# Patient Record
Sex: FEMALE | Race: WHITE | NOT HISPANIC OR LATINO | Employment: UNEMPLOYED | URBAN - METROPOLITAN AREA
[De-identification: names, ages, dates, MRNs, and addresses within clinical notes are randomized per-mention and may not be internally consistent; named-entity substitution may affect disease eponyms.]

---

## 2017-04-25 ENCOUNTER — ALLSCRIPTS OFFICE VISIT (OUTPATIENT)
Dept: OTHER | Facility: OTHER | Age: 52
End: 2017-04-25

## 2017-04-28 LAB — PAP (HISTORICAL): NORMAL

## 2017-07-20 ENCOUNTER — HOSPITAL ENCOUNTER (OUTPATIENT)
Dept: MAMMOGRAPHY | Facility: HOSPITAL | Age: 52
Discharge: HOME | End: 2017-07-20
Attending: SURGERY
Payer: COMMERCIAL

## 2017-07-20 DIAGNOSIS — Z12.31 ENCOUNTER FOR SCREENING MAMMOGRAM FOR MALIGNANT NEOPLASM OF BREAST: ICD-10-CM

## 2017-07-20 PROCEDURE — G0202 SCR MAMMO BI INCL CAD: HCPCS

## 2017-07-26 RX ORDER — MULTIVITAMIN
1 TABLET ORAL DAILY
COMMUNITY
End: 2019-06-19

## 2017-07-31 ENCOUNTER — HOSPITAL ENCOUNTER (OUTPATIENT)
Dept: MAMMOGRAPHY | Facility: CLINIC | Age: 52
Discharge: HOME | End: 2017-07-31
Payer: COMMERCIAL

## 2017-07-31 ENCOUNTER — HOSPITAL ENCOUNTER (OUTPATIENT)
Dept: ULTRASOUND IMAGING | Facility: CLINIC | Age: 52
Discharge: HOME | End: 2017-07-31
Payer: COMMERCIAL

## 2017-07-31 DIAGNOSIS — R92.8 ABNORMAL MAMMOGRAM: ICD-10-CM

## 2017-07-31 DIAGNOSIS — R92.8 OTHER ABNORMAL AND INCONCLUSIVE FINDINGS ON DIAGNOSTIC IMAGING OF BREAST: ICD-10-CM

## 2017-07-31 PROCEDURE — 76642 ULTRASOUND BREAST LIMITED: CPT

## 2017-07-31 PROCEDURE — G0279 TOMOSYNTHESIS, MAMMO: HCPCS

## 2017-07-31 PROCEDURE — G0206 DX MAMMO INCL CAD UNI: HCPCS

## 2017-08-10 ENCOUNTER — TRANSCRIBE ORDERS (OUTPATIENT)
Dept: ADMINISTRATIVE | Facility: HOSPITAL | Age: 52
End: 2017-08-10

## 2017-08-10 ENCOUNTER — ALLSCRIPTS OFFICE VISIT (OUTPATIENT)
Dept: OTHER | Facility: OTHER | Age: 52
End: 2017-08-10

## 2017-08-10 DIAGNOSIS — Z12.31 ENCOUNTER FOR MAMMOGRAM TO ESTABLISH BASELINE MAMMOGRAM: Primary | ICD-10-CM

## 2018-01-14 VITALS
TEMPERATURE: 98.8 F | WEIGHT: 124 LBS | SYSTOLIC BLOOD PRESSURE: 120 MMHG | HEART RATE: 80 BPM | RESPIRATION RATE: 14 BRPM | BODY MASS INDEX: 22.82 KG/M2 | DIASTOLIC BLOOD PRESSURE: 70 MMHG | OXYGEN SATURATION: 98 % | HEIGHT: 62 IN

## 2018-01-15 VITALS
DIASTOLIC BLOOD PRESSURE: 60 MMHG | BODY MASS INDEX: 22.45 KG/M2 | SYSTOLIC BLOOD PRESSURE: 122 MMHG | HEIGHT: 62 IN | WEIGHT: 122 LBS

## 2018-03-30 ENCOUNTER — TRANSCRIBE ORDERS (OUTPATIENT)
Dept: LAB | Facility: HOSPITAL | Age: 53
End: 2018-03-30

## 2018-03-30 ENCOUNTER — APPOINTMENT (OUTPATIENT)
Dept: LAB | Facility: HOSPITAL | Age: 53
End: 2018-03-30
Payer: COMMERCIAL

## 2018-03-30 DIAGNOSIS — Z01.84 IMMUNITY STATUS TESTING: Primary | ICD-10-CM

## 2018-03-30 DIAGNOSIS — Z01.84 IMMUNITY STATUS TESTING: ICD-10-CM

## 2018-03-30 LAB — RUBV IGG SERPL IA-ACNC: 116 IU/ML

## 2018-03-30 PROCEDURE — 86765 RUBEOLA ANTIBODY: CPT

## 2018-03-30 PROCEDURE — 86762 RUBELLA ANTIBODY: CPT

## 2018-03-30 PROCEDURE — 36415 COLL VENOUS BLD VENIPUNCTURE: CPT

## 2018-03-30 PROCEDURE — 86787 VARICELLA-ZOSTER ANTIBODY: CPT

## 2018-03-30 PROCEDURE — 86480 TB TEST CELL IMMUN MEASURE: CPT

## 2018-03-30 PROCEDURE — 86735 MUMPS ANTIBODY: CPT

## 2018-04-01 LAB
ANNOTATION COMMENT IMP: NORMAL
GAMMA INTERFERON BACKGROUND BLD IA-ACNC: 0.18 IU/ML
M TB IFN-G BLD-IMP: NEGATIVE
M TB IFN-G CD4+ BCKGRND COR BLD-ACNC: <0.01 IU/ML
M TB IFN-G CD4+ T-CELLS BLD-ACNC: 0.05 IU/ML
MITOGEN IGNF BLD-ACNC: >10 IU/ML
QUANTIFERON-TB GOLD IN TUBE: NORMAL
SERVICE CMNT-IMP: NORMAL

## 2018-04-03 LAB
MEV IGG SER QL: NORMAL
MUV IGG SER QL: NORMAL
VZV IGG SER IA-ACNC: NORMAL

## 2018-05-03 DIAGNOSIS — N94.3 PREMENSTRUAL SYNDROME: Primary | ICD-10-CM

## 2018-05-03 RX ORDER — PAROXETINE 10 MG/1
TABLET, FILM COATED ORAL
Qty: 30 TABLET | Refills: 0 | Status: SHIPPED | OUTPATIENT
Start: 2018-05-03 | End: 2018-06-07 | Stop reason: SDUPTHER

## 2018-05-09 ENCOUNTER — TRANSCRIBE ORDERS (OUTPATIENT)
Dept: ADMINISTRATIVE | Age: 53
End: 2018-05-09

## 2018-06-05 DIAGNOSIS — N94.3 PREMENSTRUAL SYNDROME: ICD-10-CM

## 2018-06-06 RX ORDER — PAROXETINE 10 MG/1
TABLET, FILM COATED ORAL
Qty: 30 TABLET | Refills: 0 | OUTPATIENT
Start: 2018-06-06

## 2018-06-07 DIAGNOSIS — N94.3 PREMENSTRUAL SYNDROME: ICD-10-CM

## 2018-06-07 RX ORDER — PAROXETINE 10 MG/1
10 TABLET, FILM COATED ORAL DAILY
Qty: 30 TABLET | Refills: 0 | Status: SHIPPED | OUTPATIENT
Start: 2018-06-07 | End: 2018-06-08 | Stop reason: SDUPTHER

## 2018-06-08 ENCOUNTER — ANNUAL EXAM (OUTPATIENT)
Dept: OBGYN CLINIC | Facility: CLINIC | Age: 53
End: 2018-06-08
Payer: COMMERCIAL

## 2018-06-08 VITALS
DIASTOLIC BLOOD PRESSURE: 72 MMHG | HEIGHT: 62 IN | SYSTOLIC BLOOD PRESSURE: 118 MMHG | WEIGHT: 124 LBS | BODY MASS INDEX: 22.82 KG/M2

## 2018-06-08 DIAGNOSIS — Z01.419 ENCNTR FOR GYN EXAM (GENERAL) (ROUTINE) W/O ABN FINDINGS: Primary | ICD-10-CM

## 2018-06-08 DIAGNOSIS — N94.3 PREMENSTRUAL SYNDROME: ICD-10-CM

## 2018-06-08 PROCEDURE — S0612 ANNUAL GYNECOLOGICAL EXAMINA: HCPCS | Performed by: NURSE PRACTITIONER

## 2018-06-08 RX ORDER — PAROXETINE 10 MG/1
10 TABLET, FILM COATED ORAL DAILY
Qty: 30 TABLET | Refills: 11 | Status: SHIPPED | OUTPATIENT
Start: 2018-06-08 | End: 2019-06-04 | Stop reason: SDUPTHER

## 2018-06-08 RX ORDER — BIOTIN 1 MG
1 TABLET ORAL DAILY
COMMUNITY
End: 2019-06-19

## 2018-06-08 NOTE — PROGRESS NOTES
Assessment/Plan   Diagnoses and all orders for this visit:    Encntr for gyn exam (general) (routine) w/o abn findings  -     GP Liquid-Based Pap + HPV Plus    Premenstrual syndrome  -     PARoxetine (PAXIL) 10 mg tablet; Take 1 tablet (10 mg total) by mouth daily for 360 days As directed    Other orders  -     Cholecalciferol (VITAMIN D3) 1000 units CAPS; Take 1 capsule by mouth daily        Discussion    Reviewed normal exam today  Pap with HPV done today  Normal breast exam today  Monthly SBEs advised  Mammograms yearly  Encourage at least 1200 mg calcium citrate + 2000 IUs vitamin D3 divided through diet and supplement throughout the day  Encourage 30-40 min weight bearing exercise most days of week  Rx for paxil sent to pharmacy  All questions have been answered to her satisfaction  RTO for annual or sooner if needed    Subjective     Selvin Molina is a 47 y o  female who presents for annual well woman exam    Last exam 17 Pap Normal last HPV testing 14 negative  Pap guidelines reviewed with patient  Pt would like Pap today  Pt denies any abnormal vaginal discharge, itching, or odor  Pt in a mutually exclusive relationship () with a male partner and denies the need for STD testing today  Menstrual Cycle:  LMP:  no bleed since  On Paxil 10mg to help with hot flashes, working well would like to continue  Pt states still having hot flashes daily worse in evening and with red wine  Denies any mood swings  + vaginal dryness and dyspareunia  OB History      Para Term  AB Living    2 2 2     2    SAB TAB Ectopic Multiple Live Births   Contraception: Post-menopausal  Practices monthly SBEs, no breast complaints today  Last Mammogram 17 BiRad II benign follows with breast specialist Natasha Greer was last seen on 8/10/17  Colonoscopy 2017 Normal per patient  Denies any bowel or bladder issues  Pt follows with PCP for regular check-ups and blood work           Review of Systems   All other systems reviewed and are negative  The following portions of the patient's history were reviewed and updated as appropriate: allergies, current medications, past family history, past medical history, past social history, past surgical history and problem list     No past medical history on file  No past surgical history on file  Family History   Problem Relation Age of Onset    Pulmonary embolism Mother     Heart attack Mother     Diabetes Father     Lung cancer Father        Social History     Social History    Marital status: /Civil Union     Spouse name: N/A    Number of children: N/A    Years of education: N/A     Occupational History    Not on file       Social History Main Topics    Smoking status: Never Smoker    Smokeless tobacco: Never Used    Alcohol use Yes      Comment: 4-5 glasses a week    Drug use: No    Sexual activity: Yes     Partners: Male     Birth control/ protection: Post-menopausal     Other Topics Concern    Not on file     Social History Narrative    No narrative on file         Current Outpatient Prescriptions:     Cholecalciferol (VITAMIN D3) 1000 units CAPS, Take 1 capsule by mouth daily, Disp: , Rfl:     co-enzyme Q-10 30 MG capsule, Take 30 mg by mouth 3 (three) times a day, Disp: , Rfl:     FOLIC ACID PO, Take by mouth, Disp: , Rfl:     MAGNESIUM PO, Take by mouth, Disp: , Rfl:     Multiple Vitamin (MULTIVITAMIN) tablet, Take 1 tablet by mouth daily, Disp: , Rfl:     PARoxetine (PAXIL) 10 mg tablet, Take 1 tablet (10 mg total) by mouth daily for 360 days As directed, Disp: 30 tablet, Rfl: 11    Allergies   Allergen Reactions    Penicillins Hives and Shortness Of Breath       Objective   Vitals:    06/08/18 0832   BP: 118/72   BP Location: Left arm   Patient Position: Sitting   Cuff Size: Standard   Weight: 56 2 kg (124 lb)   Height: 5' 2" (1 575 m)     Physical Exam   Constitutional: She is oriented to person, place, and time  She appears well-developed and well-nourished  HENT:   Head: Normocephalic  Neck: Normal range of motion  Neck supple  No tracheal deviation present  No thyromegaly present  Cardiovascular: Normal rate, regular rhythm and normal heart sounds  Pulmonary/Chest: Effort normal and breath sounds normal  Right breast exhibits no inverted nipple, no mass, no nipple discharge, no skin change and no tenderness  Left breast exhibits no inverted nipple, no mass, no nipple discharge, no skin change and no tenderness  Breasts are symmetrical    Abdominal: Soft  Bowel sounds are normal  She exhibits no distension and no mass  There is no tenderness  There is no rebound and no guarding  Genitourinary: Vagina normal and uterus normal  Rectal exam shows guaiac negative stool  No breast swelling, tenderness, discharge or bleeding  No labial fusion  There is no rash, tenderness, lesion or injury on the right labia  There is no rash, tenderness, lesion or injury on the left labia  Cervix exhibits no motion tenderness, no discharge and no friability  Right adnexum displays no mass, no tenderness and no fullness  Left adnexum displays no mass, no tenderness and no fullness  Genitourinary Comments: Vaginal atrophy   Musculoskeletal: Normal range of motion  Neurological: She is alert and oriented to person, place, and time  Skin: Skin is warm and dry  Psychiatric: She has a normal mood and affect   Her behavior is normal  Judgment and thought content normal

## 2018-06-12 LAB
HPV HR 12 DNA CVX QL NAA+PROBE: NOT DETECTED
HPV16 DNA SPEC QL NAA+PROBE: NOT DETECTED
HPV18 DNA SPEC QL NAA+PROBE: NOT DETECTED
THIN PREP CVX: NORMAL

## 2018-08-02 ENCOUNTER — HOSPITAL ENCOUNTER (OUTPATIENT)
Dept: MAMMOGRAPHY | Facility: HOSPITAL | Age: 53
Discharge: HOME | End: 2018-08-02
Attending: SURGERY
Payer: COMMERCIAL

## 2018-08-02 DIAGNOSIS — Z12.31 ENCOUNTER FOR SCREENING MAMMOGRAM FOR MALIGNANT NEOPLASM OF BREAST: ICD-10-CM

## 2018-08-02 DIAGNOSIS — Z12.31 ENCOUNTER FOR MAMMOGRAM TO ESTABLISH BASELINE MAMMOGRAM: ICD-10-CM

## 2018-08-02 PROCEDURE — 77063 BREAST TOMOSYNTHESIS BI: CPT

## 2018-08-02 PROCEDURE — 77067 SCR MAMMO BI INCL CAD: CPT

## 2018-08-09 ENCOUNTER — OFFICE VISIT (OUTPATIENT)
Dept: SURGICAL ONCOLOGY | Facility: CLINIC | Age: 53
End: 2018-08-09
Payer: COMMERCIAL

## 2018-08-09 VITALS
HEART RATE: 74 BPM | HEIGHT: 62 IN | BODY MASS INDEX: 23.19 KG/M2 | TEMPERATURE: 98.3 F | SYSTOLIC BLOOD PRESSURE: 120 MMHG | WEIGHT: 126 LBS | RESPIRATION RATE: 16 BRPM | DIASTOLIC BLOOD PRESSURE: 70 MMHG

## 2018-08-09 DIAGNOSIS — N60.19 FIBROCYSTIC BREAST DISEASE, UNSPECIFIED LATERALITY: ICD-10-CM

## 2018-08-09 DIAGNOSIS — R92.2 DENSE BREAST TISSUE: ICD-10-CM

## 2018-08-09 DIAGNOSIS — Z12.31 SCREENING MAMMOGRAM, ENCOUNTER FOR: Primary | ICD-10-CM

## 2018-08-09 DIAGNOSIS — Z80.3 FAMILY HISTORY OF BREAST CANCER IN FEMALE: ICD-10-CM

## 2018-08-09 PROCEDURE — 99213 OFFICE O/P EST LOW 20 MIN: CPT | Performed by: SURGERY

## 2018-08-09 NOTE — PROGRESS NOTES
Surgical Oncology Follow Up       [address]  [facility] [address] [address]    Arlette Winn  1965  [id number]  [address] [facility]  [facility] [address] [address]    Chief Complaint   Patient presents with    Breast Problem     Pt is here for 1 year follow up       Assessment/Plan   Diagnoses and all orders for this visit:    Screening mammogram, encounter for  -     Mammo screening bilateral w 3d & cad; Future    Family history of breast cancer in female    Fibrocystic breast disease, unspecified laterality    Dense breast tissue        Advance Care Planning/Advance Directives:  Did not discuss  with the patient  Oncology History:     No history exists  History of Present Illness: follow up  -Interval History: none    Review of Systems:  Review of Systems   Constitutional: Negative for appetite change and fever  Hot flashes   Eyes: Negative  Respiratory: Negative for shortness of breath  Cardiovascular: Negative  Gastrointestinal: Negative  Endocrine: Negative  Genitourinary: Negative  Musculoskeletal: Negative  Negative for arthralgias and myalgias  Skin:        Cold sores from the sun   Allergic/Immunologic: Negative  Neurological: Negative  Hematological: Negative  Negative for adenopathy  Does not bruise/bleed easily  Psychiatric/Behavioral: Negative  Patient Active Problem List   Diagnosis    Dense breast tissue    Fibrocystic breast disease, unspecified laterality    Hot flashes due to menopause    Other fatigue    Anxiety    Family history of breast cancer in female    Screening mammogram, encounter for     No past medical history on file    Past Surgical History:   Procedure Laterality Date    ABDOMINOPLASTY      APPENDECTOMY      CERVIX SURGERY      cryosurgery    NEUROPLASTY / TRANSPOSITION MEDIAN NERVE AT CARPAL TUNNEL      SINUS SURGERY       Family History   Problem Relation Age of Onset    Pulmonary embolism Mother     Heart attack Mother     Diabetes Father     Lung cancer Father     Prostate cancer Father     Colon cancer Paternal Aunt         age dx unk    [employer] Breast cancer Paternal [de-identified]         age dx unk     Colon cancer Paternal Uncle         age dx unk     Lung cancer Paternal Uncle         age dx unk      Social History     Social History    Marital status: /Civil Union     Spouse name: N/A    Number of children: N/A    Years of education: N/A     Occupational History    Not on file       Social History Main Topics    Smoking status: Never Smoker    Smokeless tobacco: Never Used    Alcohol use Yes      Comment: 4-5 glasses a week    Drug use: No    Sexual activity: Yes     Partners: Male     Birth control/ protection: Post-menopausal     Other Topics Concern    Not on file     Social History Narrative    No narrative on file       Current Outpatient Prescriptions:     Cholecalciferol (VITAMIN D3) 1000 units CAPS, Take 1 capsule by mouth daily, Disp: , Rfl:     co-enzyme Q-10 30 MG capsule, Take 30 mg by mouth 3 (three) times a day, Disp: , Rfl:     FOLIC ACID PO, Take by mouth, Disp: , Rfl:     MAGNESIUM PO, Take by mouth, Disp: , Rfl:     Multiple Vitamin (MULTIVITAMIN) tablet, Take 1 tablet by mouth daily, Disp: , Rfl:     PARoxetine (PAXIL) 10 mg tablet, Take 1 tablet (10 mg total) by mouth daily for 360 days As directed, Disp: 30 tablet, Rfl: 11  Allergies   Allergen Reactions    Penicillins Hives and Shortness Of Breath     Pt states she is not allergic to penicillins        The following portions of the patient's history were reviewed and updated as appropriate: allergies, current medications, past family history, past medical history, past social history, past surgical history and problem list         Vitals:    08/09/18 0818   BP: 120/70   Pulse: 74   Resp: 16   Temp: 98 3 °F (36 8 °C)       Physical Exam   Constitutional: She is oriented to person, place, and time  She appears well-developed and well-nourished  HENT:   Head: Normocephalic and atraumatic  Pulmonary/Chest: Right breast exhibits no inverted nipple, no mass, no nipple discharge, no skin change and no tenderness  Left breast exhibits no inverted nipple, no mass, no nipple discharge, no skin change and no tenderness  Lymphadenopathy:        Right axillary: No pectoral and no lateral adenopathy present  Left axillary: No pectoral and no lateral adenopathy present  Right: No supraclavicular adenopathy present  Left: No supraclavicular adenopathy present  Neurological: She is alert and oriented to person, place, and time  Psychiatric: She has a normal mood and affect  Imaging  08/02/2018 bilateral screening mammogram is benign BI-RADS two density of three  I reviewed the above  imaging data  Discussion/Summary:  81-year-old female with dense breast tissue, fibrocystic changes and family history of breast cancer  There are no concerns on examination today or recent mammogram   I will see her again in one year or sooner should the need arise

## 2019-06-04 ENCOUNTER — TELEPHONE (OUTPATIENT)
Dept: OBGYN CLINIC | Facility: CLINIC | Age: 54
End: 2019-06-04

## 2019-06-04 ENCOUNTER — EVALUATION (OUTPATIENT)
Dept: PHYSICAL THERAPY | Facility: CLINIC | Age: 54
End: 2019-06-04
Payer: COMMERCIAL

## 2019-06-04 DIAGNOSIS — N94.3 PREMENSTRUAL SYNDROME: ICD-10-CM

## 2019-06-04 DIAGNOSIS — G89.29 CHRONIC LEFT-SIDED LOW BACK PAIN WITH LEFT-SIDED SCIATICA: Primary | ICD-10-CM

## 2019-06-04 DIAGNOSIS — M54.42 CHRONIC LEFT-SIDED LOW BACK PAIN WITH LEFT-SIDED SCIATICA: Primary | ICD-10-CM

## 2019-06-04 RX ORDER — PAROXETINE 10 MG/1
TABLET, FILM COATED ORAL
Qty: 90 TABLET | Refills: 0 | Status: SHIPPED | OUTPATIENT
Start: 2019-06-04 | End: 2019-06-19 | Stop reason: SDUPTHER

## 2019-06-05 PROCEDURE — 97161 PT EVAL LOW COMPLEX 20 MIN: CPT | Performed by: PHYSICAL THERAPIST

## 2019-06-11 ENCOUNTER — OFFICE VISIT (OUTPATIENT)
Dept: PHYSICAL THERAPY | Facility: CLINIC | Age: 54
End: 2019-06-11
Payer: COMMERCIAL

## 2019-06-11 DIAGNOSIS — G89.29 CHRONIC LEFT-SIDED LOW BACK PAIN WITH LEFT-SIDED SCIATICA: Primary | ICD-10-CM

## 2019-06-11 DIAGNOSIS — M54.42 CHRONIC LEFT-SIDED LOW BACK PAIN WITH LEFT-SIDED SCIATICA: Primary | ICD-10-CM

## 2019-06-11 PROCEDURE — 97112 NEUROMUSCULAR REEDUCATION: CPT | Performed by: PHYSICAL THERAPIST

## 2019-06-13 ENCOUNTER — APPOINTMENT (OUTPATIENT)
Dept: PHYSICAL THERAPY | Facility: CLINIC | Age: 54
End: 2019-06-13
Payer: COMMERCIAL

## 2019-06-19 ENCOUNTER — ANNUAL EXAM (OUTPATIENT)
Dept: OBGYN CLINIC | Facility: CLINIC | Age: 54
End: 2019-06-19
Payer: COMMERCIAL

## 2019-06-19 VITALS
WEIGHT: 124 LBS | DIASTOLIC BLOOD PRESSURE: 76 MMHG | SYSTOLIC BLOOD PRESSURE: 110 MMHG | HEIGHT: 62 IN | BODY MASS INDEX: 22.82 KG/M2

## 2019-06-19 DIAGNOSIS — N94.3 PREMENSTRUAL SYNDROME: ICD-10-CM

## 2019-06-19 DIAGNOSIS — Z01.419 ROUTINE GYNECOLOGICAL EXAMINATION: Primary | ICD-10-CM

## 2019-06-19 PROCEDURE — 99396 PREV VISIT EST AGE 40-64: CPT | Performed by: PHYSICIAN ASSISTANT

## 2019-06-19 RX ORDER — PAROXETINE 10 MG/1
10 TABLET, FILM COATED ORAL DAILY
Qty: 90 TABLET | Refills: 3 | Status: SHIPPED | OUTPATIENT
Start: 2019-06-19 | End: 2020-08-26

## 2019-06-26 ENCOUNTER — APPOINTMENT (OUTPATIENT)
Dept: PHYSICAL THERAPY | Facility: CLINIC | Age: 54
End: 2019-06-26
Payer: COMMERCIAL

## 2019-06-26 LAB — THIN PREP CVX: NORMAL

## 2019-06-28 ENCOUNTER — APPOINTMENT (OUTPATIENT)
Dept: PHYSICAL THERAPY | Facility: CLINIC | Age: 54
End: 2019-06-28
Payer: COMMERCIAL

## 2019-07-10 ENCOUNTER — OFFICE VISIT (OUTPATIENT)
Dept: PHYSICAL THERAPY | Facility: CLINIC | Age: 54
End: 2019-07-10
Payer: COMMERCIAL

## 2019-07-10 DIAGNOSIS — G89.29 CHRONIC LEFT-SIDED LOW BACK PAIN WITH LEFT-SIDED SCIATICA: Primary | ICD-10-CM

## 2019-07-10 DIAGNOSIS — M54.42 CHRONIC LEFT-SIDED LOW BACK PAIN WITH LEFT-SIDED SCIATICA: Primary | ICD-10-CM

## 2019-07-10 PROCEDURE — 97112 NEUROMUSCULAR REEDUCATION: CPT | Performed by: PHYSICAL THERAPIST

## 2019-07-10 NOTE — PROGRESS NOTES
Daily Note     Today's date: 7/10/2019  Patient name: Lyudmila López  : 1965  MRN: [id number]  Referring provider: April Murray DO  Dx:   Encounter Diagnosis     ICD-10-CM    1  Chronic left-sided low back pain with left-sided sciatica M54 42     G89 29                   Subjective: Pt reports good compliance with HEP  Pt reports a 90% reduction in LBP and LE parasthesias  Objective: See treatment diary below  Updated HEP with reintroduction to flexion activities  Assessment: Pt demonstrated a good understanding of long-term management of her LBP and radicular sx  Plan: Pt is appropriate for d/c  Precautions: none    PMHx: anxiety      Dx: L L5/S1 posteriolateral derangement with an extension and stabilization classification      Daily Treatment Diary      Manual  6/4  6/11  7/10                 S1 MWM prone press-ups    1x10  2x10                 Laser L5/S1    3500J  3500J                                                                                               Exercise Diary    7/10                 Abdominal bracing 5"x5                     bridges 1x15  2x15  3x15                 Prone thoracic ext with cervical retraction 1x10  1x15 3x15                 Birddog                       Prone press-ups 1x10  2x10  3x10                 Standing lumbar extension 1x10  3x10  3x10                 Posture education 5 min  5 min                    mini-squats                       Box lifting from 4" step    laundry basket/ 1x10                    crunches     3x20                                                                                                                                                                                                                                                                       Modalities

## 2019-08-05 ENCOUNTER — HOSPITAL ENCOUNTER (OUTPATIENT)
Dept: MAMMOGRAPHY | Facility: HOSPITAL | Age: 54
Discharge: HOME | End: 2019-08-05
Attending: SURGERY
Payer: COMMERCIAL

## 2019-08-05 VITALS — WEIGHT: 124 LBS | BODY MASS INDEX: 22.82 KG/M2 | HEIGHT: 62 IN

## 2019-08-05 DIAGNOSIS — Z12.31 SCREENING MAMMOGRAM, ENCOUNTER FOR: ICD-10-CM

## 2019-08-05 PROCEDURE — 77067 SCR MAMMO BI INCL CAD: CPT

## 2019-08-05 PROCEDURE — 77063 BREAST TOMOSYNTHESIS BI: CPT

## 2019-08-08 ENCOUNTER — OFFICE VISIT (OUTPATIENT)
Dept: SURGICAL ONCOLOGY | Facility: CLINIC | Age: 54
End: 2019-08-08
Payer: COMMERCIAL

## 2019-08-08 VITALS
DIASTOLIC BLOOD PRESSURE: 68 MMHG | BODY MASS INDEX: 23.19 KG/M2 | TEMPERATURE: 98.5 F | WEIGHT: 126 LBS | RESPIRATION RATE: 16 BRPM | HEART RATE: 68 BPM | SYSTOLIC BLOOD PRESSURE: 110 MMHG | HEIGHT: 62 IN

## 2019-08-08 DIAGNOSIS — Z12.31 SCREENING MAMMOGRAM, ENCOUNTER FOR: ICD-10-CM

## 2019-08-08 DIAGNOSIS — R92.2 DENSE BREAST TISSUE: ICD-10-CM

## 2019-08-08 DIAGNOSIS — N60.19 FIBROCYSTIC BREAST DISEASE, UNSPECIFIED LATERALITY: Primary | ICD-10-CM

## 2019-08-08 DIAGNOSIS — Z80.3 FAMILY HISTORY OF BREAST CANCER IN FEMALE: ICD-10-CM

## 2019-08-08 PROCEDURE — 99213 OFFICE O/P EST LOW 20 MIN: CPT | Performed by: SURGERY

## 2019-08-08 NOTE — PROGRESS NOTES
Surgical Oncology Follow Up       [address] [facility] [place]  [address] [facility] [address]  [place] [address]    Renetta Lemons  1965  [id number]  [address] [facility] [place]  [address] [facility] [place] [address]    Chief Complaint   Patient presents with    Follow-up       Assessment/Plan   Diagnoses and all orders for this visit:    Fibrocystic breast disease, unspecified laterality    Screening mammogram, encounter for  -     Mammo screening bilateral w 3d & cad; Future    Dense breast tissue    Family history of breast cancer in female        Advance Care Planning/Advance Directives:  Did not discuss  with the patient  Oncology History:     No history exists  History of Present Illness: Follow-up  -Interval History:  None    Review of Systems:  Review of Systems   Constitutional: Negative  Negative for appetite change and fever  Eyes: Negative  Respiratory: Negative for shortness of breath  Cardiovascular: Negative  Gastrointestinal: Negative  Endocrine: Negative  Genitourinary: Negative  Musculoskeletal: Negative  Negative for arthralgias and myalgias  Skin: Negative  Allergic/Immunologic: Negative  Neurological: Negative  Hematological: Negative  Negative for adenopathy  Does not bruise/bleed easily  Psychiatric/Behavioral: Negative  Patient Active Problem List   Diagnosis    Dense breast tissue    Fibrocystic breast disease, unspecified laterality    Hot flashes due to menopause    Other fatigue    Anxiety    Family history of breast cancer in female    Screening mammogram, encounter for    Routine gynecological examination     History reviewed  No pertinent past medical history    Past Surgical History:   Procedure Laterality Date    ABDOMINOPLASTY      APPENDECTOMY      CERVIX SURGERY      cryosurgery    NEUROPLASTY / TRANSPOSITION MEDIAN NERVE AT CARPAL TUNNEL      SINUS SURGERY       Family History   Problem Relation Age of Onset    Pulmonary embolism Mother     Heart attack Mother     Diabetes Father     Lung cancer Father     Prostate cancer Father 64    Breast cancer Paternal Aunt 47    Colon cancer Paternal Uncle 62    Lung cancer Paternal Uncle         age dx unk     Colon cancer Paternal Aunt 39     Social History     Socioeconomic History    Marital status: /Civil Union     Spouse name: Not on file    Number of children: Not on file    Years of education: Not on file    Highest education level: Not on file   Occupational History    Not on file   Social Needs    Financial resource strain: Not on file    Food insecurity:     Worry: Not on file     Inability: Not on file    Transportation needs:     Medical: Not on file     Non-medical: Not on file   Tobacco Use    Smoking status: Never Smoker    Smokeless tobacco: Never Used   Substance and Sexual Activity    Alcohol use: Yes     Frequency: 2-3 times a week     Drinks per session: 3 or 4     Binge frequency: Never     Comment: 4-5 glasses a week    Drug use: No    Sexual activity: Yes     Partners: Male     Birth control/protection: Post-menopausal   Lifestyle    Physical activity:     Days per week: Not on file     Minutes per session: Not on file    Stress: Not on file   Relationships    Social connections:     Talks on phone: Not on file     Gets together: Not on file     Attends Tenriism service: Not on file     Active member of club or organization: Not on file     Attends meetings of clubs or organizations: Not on file     Relationship status: Not on file    Intimate partner violence:     Fear of current or ex partner: Not on file     Emotionally abused: Not on file     Physically abused: Not on file     Forced sexual activity: Not on file   Other Topics Concern    Not on file   Social History Narrative    Not on file       Current Outpatient Medications:    PARoxetine (PAXIL) 10 mg tablet, Take 1 tablet (10 mg total) by mouth daily, Disp: 90 tablet, Rfl: 3  Allergies   Allergen Reactions    Penicillins Hives and Shortness Of Breath     Pt states she is not allergic to penicillins        The following portions of the patient's history were reviewed and updated as appropriate: allergies, current medications, past family history, past medical history, past social history, past surgical history and problem list         Vitals:    08/08/19 0838   BP: 110/68   Pulse: 68   Resp: 16   Temp: 98 5 °F (36 9 °C)       Physical Exam   Constitutional: She is oriented to person, place, and time  She appears well-developed and well-nourished  HENT:   Head: Normocephalic and atraumatic  Pulmonary/Chest: Right breast exhibits no inverted nipple, no mass, no nipple discharge, no skin change and no tenderness  Left breast exhibits no inverted nipple, no mass, no nipple discharge, no skin change and no tenderness  Lymphadenopathy:        Right axillary: No pectoral and no lateral adenopathy present  Left axillary: No pectoral and no lateral adenopathy present  Right: No supraclavicular adenopathy present  Left: No supraclavicular adenopathy present  Neurological: She is alert and oriented to person, place, and time  Psychiatric: She has a normal mood and affect  Results:  Labs:      Imaging  08/05/2019 bilateral 3D screening mammogram is benign BI-RADS one with a density of three    I reviewed the above  imaging data  Discussion/Summary:  81-year-old female with dense breast tissue, fibrocystic changes and family history of breast cancer  There are no concerns on examination today  Her recent mammogram was benign  I will therefore see her again in one year for another exam or sooner should the need arise

## 2020-08-06 ENCOUNTER — HOSPITAL ENCOUNTER (OUTPATIENT)
Dept: MAMMOGRAPHY | Facility: HOSPITAL | Age: 55
Discharge: HOME | End: 2020-08-06
Attending: SURGERY
Payer: COMMERCIAL

## 2020-08-06 VITALS — BODY MASS INDEX: 23.19 KG/M2 | WEIGHT: 126 LBS | HEIGHT: 62 IN

## 2020-08-06 DIAGNOSIS — Z12.31 SCREENING MAMMOGRAM, ENCOUNTER FOR: ICD-10-CM

## 2020-08-06 PROCEDURE — 77067 SCR MAMMO BI INCL CAD: CPT

## 2020-08-06 PROCEDURE — 77063 BREAST TOMOSYNTHESIS BI: CPT

## 2020-08-12 ENCOUNTER — HOSPITAL ENCOUNTER (OUTPATIENT)
Dept: ULTRASOUND IMAGING | Facility: CLINIC | Age: 55
Discharge: HOME | End: 2020-08-12
Payer: COMMERCIAL

## 2020-08-12 ENCOUNTER — HOSPITAL ENCOUNTER (OUTPATIENT)
Dept: MAMMOGRAPHY | Facility: CLINIC | Age: 55
Discharge: HOME | End: 2020-08-12
Payer: COMMERCIAL

## 2020-08-12 VITALS — BODY MASS INDEX: 23.19 KG/M2 | TEMPERATURE: 98 F | WEIGHT: 126 LBS | HEIGHT: 62 IN

## 2020-08-12 DIAGNOSIS — R92.8 ABNORMAL MAMMOGRAM: ICD-10-CM

## 2020-08-12 PROCEDURE — G0279 TOMOSYNTHESIS, MAMMO: HCPCS

## 2020-08-12 PROCEDURE — 77065 DX MAMMO INCL CAD UNI: CPT

## 2020-08-12 PROCEDURE — 76642 ULTRASOUND BREAST LIMITED: CPT

## 2020-08-13 ENCOUNTER — OFFICE VISIT (OUTPATIENT)
Dept: SURGICAL ONCOLOGY | Facility: CLINIC | Age: 55
End: 2020-08-13
Payer: COMMERCIAL

## 2020-08-13 VITALS
RESPIRATION RATE: 16 BRPM | TEMPERATURE: 98.3 F | HEIGHT: 62 IN | BODY MASS INDEX: 23.74 KG/M2 | SYSTOLIC BLOOD PRESSURE: 120 MMHG | WEIGHT: 129 LBS | HEART RATE: 67 BPM | DIASTOLIC BLOOD PRESSURE: 82 MMHG

## 2020-08-13 DIAGNOSIS — Z80.3 FAMILY HISTORY OF BREAST CANCER IN FEMALE: ICD-10-CM

## 2020-08-13 DIAGNOSIS — Z12.31 SCREENING MAMMOGRAM, ENCOUNTER FOR: ICD-10-CM

## 2020-08-13 DIAGNOSIS — Z87.898 HX OF ABNORMAL MAMMOGRAM: ICD-10-CM

## 2020-08-13 DIAGNOSIS — N60.19 FIBROCYSTIC BREAST DISEASE, UNSPECIFIED LATERALITY: Primary | ICD-10-CM

## 2020-08-13 DIAGNOSIS — R92.2 DENSE BREAST TISSUE: ICD-10-CM

## 2020-08-13 PROCEDURE — 99213 OFFICE O/P EST LOW 20 MIN: CPT | Performed by: SURGERY

## 2020-08-13 NOTE — PROGRESS NOTES
Surgical Oncology Follow Up       [address]  [facility] [place]  [address] [facility] [address]    Joshua Souzakenneth  1965  [id number]  [address] [facility]  [facility] [place]  [address] [facility] [address]    Chief Complaint   Patient presents with    Follow-up     Pt is here for 1 year f/u       Assessment/Plan   Diagnoses and all orders for this visit:    Fibrocystic breast disease, unspecified laterality    Screening mammogram, encounter for  -     Mammo screening bilateral w 3d & cad; Future    Dense breast tissue    Family history of breast cancer in female    Hx of abnormal mammogram        Advance Care Planning/Advance Directives:  Did not discuss  with the patient  Oncology History:    Oncology History    No history exists  History of Present Illness: Follow-up visit secondary to dense breast tissue, family history and fibrocystic changes, no concerns  -Interval History:  Recent mammogram with call back    Review of Systems:  Review of Systems   Constitutional: Negative  Negative for appetite change and fever  Eyes: Negative  Respiratory: Negative for shortness of breath  Cardiovascular: Negative  Gastrointestinal: Negative  Endocrine: Negative  Genitourinary: Negative  Musculoskeletal: Negative  Negative for arthralgias and myalgias  Skin: Negative  Allergic/Immunologic: Negative  Neurological: Negative  Hematological: Negative  Negative for adenopathy  Does not bruise/bleed easily  Psychiatric/Behavioral: Negative  Patient Active Problem List   Diagnosis    Dense breast tissue    Fibrocystic breast disease, unspecified laterality    Hot flashes due to menopause    Other fatigue    Anxiety    Family history of breast cancer in female    Screening mammogram, encounter for    Routine gynecological examination     History reviewed   No pertinent past medical history    Past Surgical History:   Procedure Laterality Date    ABDOMINOPLASTY      APPENDECTOMY      CERVIX SURGERY      cryosurgery    NEUROPLASTY / TRANSPOSITION MEDIAN NERVE AT CARPAL TUNNEL      SINUS SURGERY       Family History   Problem Relation Age of Onset    Pulmonary embolism Mother     Heart attack Mother     Diabetes Father     Lung cancer Father     Prostate cancer Father 64    Breast cancer Paternal Aunt 47    Colon cancer Paternal Uncle 62    Lung cancer Paternal Uncle         age dx unk     Colon cancer Paternal Aunt 39    No Known Problems Sister     No Known Problems Daughter     Brain cancer Paternal Grandfather         unsure of age   Yamileth Chamberss No Known Problems Daughter     No Known Problems Sister      Social History     Socioeconomic History    Marital status: /Civil Union     Spouse name: Not on file    Number of children: Not on file    Years of education: Not on file    Highest education level: Not on file   Occupational History    Not on file   Social Needs    Financial resource strain: Not on file    Food insecurity     Worry: Not on file     Inability: Not on file    Transportation needs     Medical: Not on file     Non-medical: Not on file   Tobacco Use    Smoking status: Never Smoker    Smokeless tobacco: Never Used   Substance and Sexual Activity    Alcohol use: Yes     Frequency: 2-3 times a week     Drinks per session: 3 or 4     Binge frequency: Never     Comment: 4-5 glasses a week    Drug use: No    Sexual activity: Yes     Partners: Male     Birth control/protection: Post-menopausal   Lifestyle    Physical activity     Days per week: Not on file     Minutes per session: Not on file    Stress: Not on file   Relationships    Social connections     Talks on phone: Not on file     Gets together: Not on file     Attends Jehovah's witness service: Not on file     Active member of club or organization: Not on file     Attends meetings of clubs or organizations: Not on file     Relationship status: Not on file    Intimate partner violence     Fear of current or ex partner: Not on file     Emotionally abused: Not on file     Physically abused: Not on file     Forced sexual activity: Not on file   Other Topics Concern    Not on file   Social History Narrative    Not on file       Current Outpatient Medications:     PARoxetine (PAXIL) 10 mg tablet, Take 1 tablet (10 mg total) by mouth daily, Disp: 90 tablet, Rfl: 3  Allergies   Allergen Reactions    Penicillins Hives and Shortness Of Breath     Pt states she is not allergic to penicillins        The following portions of the patient's history were reviewed and updated as appropriate: allergies, current medications, past family history, past medical history, past social history, past surgical history and problem list         Vitals:    08/13/20 0836   BP: 120/82   Pulse: 67   Resp: 16   Temp: 98 3 °F (36 8 °C)       Physical Exam  Constitutional:       Appearance: She is well-developed  HENT:      Head: Normocephalic and atraumatic  Chest:      Breasts:         Right: No inverted nipple, mass, nipple discharge, skin change or tenderness  Left: No inverted nipple, mass, nipple discharge, skin change or tenderness  Lymphadenopathy:      Upper Body:      Right upper body: No supraclavicular, axillary or pectoral adenopathy  Left upper body: No supraclavicular, axillary or pectoral adenopathy  Neurological:      Mental Status: She is alert and oriented to person, place, and time     Psychiatric:         Mood and Affect: Mood normal            Results:  Labs:      Imaging  08/06/2020 bilateral 3D screening mammogram was a BI-RADS 0 secondary to two masses on the right side, density is a three  08/12/2020 right 3D diagnostic mammogram as well as right breast ultrasound there are two simple appearing cyst corresponding to the mammographic abnormalities, BI-RADS two    I reviewed the above imaging data  Discussion/Summary:  50-year-old female with dense breast tissue, fibrocystic changes and family history of breast cancer  There are no concerns on her examination today  She had an abnormal screen but her call back was benign showing simple appearing cysts  I will therefore see her again in one year following her mammogram or sooner should the need arise

## 2020-08-24 DIAGNOSIS — N94.3 PREMENSTRUAL SYNDROME: ICD-10-CM

## 2020-08-26 RX ORDER — PAROXETINE 10 MG/1
10 TABLET, FILM COATED ORAL DAILY
Qty: 90 TABLET | Refills: 0 | Status: SHIPPED | OUTPATIENT
Start: 2020-08-26 | End: 2020-09-22

## 2020-09-22 ENCOUNTER — ANNUAL EXAM (OUTPATIENT)
Dept: OBGYN CLINIC | Facility: CLINIC | Age: 55
End: 2020-09-22
Payer: COMMERCIAL

## 2020-09-22 VITALS
HEIGHT: 62 IN | DIASTOLIC BLOOD PRESSURE: 80 MMHG | WEIGHT: 131 LBS | SYSTOLIC BLOOD PRESSURE: 122 MMHG | BODY MASS INDEX: 24.11 KG/M2 | TEMPERATURE: 97.4 F

## 2020-09-22 DIAGNOSIS — R53.83 FATIGUE, UNSPECIFIED TYPE: ICD-10-CM

## 2020-09-22 DIAGNOSIS — Z13.220 SCREENING FOR LIPID DISORDERS: ICD-10-CM

## 2020-09-22 DIAGNOSIS — R63.5 WEIGHT GAIN: ICD-10-CM

## 2020-09-22 DIAGNOSIS — Z01.419 ROUTINE GYNECOLOGICAL EXAMINATION: Primary | ICD-10-CM

## 2020-09-22 PROBLEM — R92.8 ABNORMAL MAMMOGRAM, UNSPECIFIED: Status: ACTIVE | Noted: 2020-09-22

## 2020-09-22 PROBLEM — R23.2 HOT FLASHES: Status: ACTIVE | Noted: 2020-09-22

## 2020-09-22 PROCEDURE — G0145 SCR C/V CYTO,THINLAYER,RESCR: HCPCS | Performed by: PHYSICIAN ASSISTANT

## 2020-09-22 PROCEDURE — 99396 PREV VISIT EST AGE 40-64: CPT | Performed by: PHYSICIAN ASSISTANT

## 2020-09-22 RX ORDER — FLUTICASONE PROPIONATE 50 MCG
1 SPRAY, SUSPENSION (ML) NASAL DAILY
COMMUNITY

## 2020-09-22 NOTE — PROGRESS NOTES
Assessment/Plan   Problem List Items Addressed This Visit        Other    Routine gynecological examination - Primary     Pap guidelines reviewed  Pap with reflex done today secondary to no TZ seen on previous pap smears on file  Patient would like to wean off of Paxil, Will plan to take every other day for 1-2 weeks and then discontinue  Reviewed weight gain with patient  Will get routine blood work to be sure thyroid function normal as well as glucose and cholesterol  Reviewed with patient Paxil can cause weight gain and may be a factor  Office will call with blood work results and appropriate follow up  Mammogram is scheduled  Return to office for annual or as needed  Relevant Orders    Liquid-based pap, screening      Other Visit Diagnoses     Weight gain        Relevant Orders    Comprehensive metabolic panel    Lipid panel    T4, free    TSH, 3rd generation    Fatigue, unspecified type        Relevant Orders    CBC and differential    Comprehensive metabolic panel    Lipid panel    T4, free    TSH, 3rd generation    Screening for lipid disorders        Relevant Orders    Lipid panel          Subjective:     Patient ID: Antonio Saunders is a 48 y o  y o  female  HPI  49 yo seen for annual exam  LMP: 2015 no bleeding since  Patient has been taking Paxil 10mg daily for hot flashes, reports hot flashes more tolerable and would like to trial off Paxil  Does report weight gain in the last year  Eats healthy diet and watches portions  Also walks the dog daily  Has been working from home but has not seen any changes in her activity or diet  Per the chart patient has gained 7 lbs since visit 6/2019  Last pap: 6/19/2019 NILM  6/8/2018 NILM (-)HRHPV  No TZ seen on either  Last mammogram: 8/12/2020 BIRADS 2: Benign  Last colonoscopy: Reports age 49, unsure where she had it done but it was [facility] surg[facility] [address] [place]  Was told she was good for 10 years     The following portions of the patient's history were reviewed and updated as appropriate:   She  has no past medical history on file  She   Patient Active Problem List    Diagnosis Date Noted    Abnormal mammogram, unspecified 2020    Hot flashes 2020    Routine gynecological examination 2019    Screening mammogram, encounter for 2018    Family history of breast cancer in female     Dense breast tissue 2016    Hot flashes due to menopause 2015    Anxiety 2015    Fibrocystic breast disease, unspecified laterality 2014     She  has a past surgical history that includes Abdominoplasty; Appendectomy; Cervix surgery; Neuroplasty / transposition median nerve at carpal tunnel; and Sinus surgery  Her family history includes Brain cancer in her paternal grandfather; Breast cancer (age of onset: 47) in her paternal aunt; Colon cancer (age of onset: 39) in her paternal aunt; Colon cancer (age of onset: 62) in her paternal uncle; Diabetes in her father; Heart attack in her mother; Lung cancer in her father and paternal uncle; No Known Problems in her daughter, daughter, sister, and sister; Prostate cancer (age of onset: 64) in her father; Pulmonary embolism in her mother  She  reports that she has never smoked  She has never used smokeless tobacco  She reports current alcohol use  She reports that she does not use drugs  Current Outpatient Medications   Medication Sig Dispense Refill    fluticasone (FLONASE) 50 mcg/act nasal spray 1 spray into each nostril daily       No current facility-administered medications for this visit  She is allergic to penicillins       Menstrual History:  OB History        2    Para   2    Term   2            AB        Living   2       SAB        TAB        Ectopic        Multiple        Live Births               Obstetric Comments   Menarche age 16               No LMP recorded   Patient is postmenopausal          Review of Systems   Constitutional: Positive for unexpected weight change (weight gain)  Negative for fatigue and fever  HENT: Negative for dental problem and sinus pressure  Eyes: Negative for visual disturbance  Respiratory: Negative for cough, shortness of breath and wheezing  Cardiovascular: Negative for chest pain  Gastrointestinal: Negative for abdominal pain, blood in stool, constipation, diarrhea, nausea and vomiting  Endocrine: Negative for polydipsia  Genitourinary: Negative for difficulty urinating, dyspareunia, dysuria, frequency, hematuria, pelvic pain and urgency  Musculoskeletal: Negative for arthralgias and back pain  Neurological: Negative for dizziness, seizures, light-headedness and headaches  Psychiatric/Behavioral: Negative for suicidal ideas  The patient is not nervous/anxious  Objective:  Vitals:    09/22/20 0930   BP: 122/80   BP Location: Left arm   Patient Position: Sitting   Cuff Size: Standard   Temp: (!) 97 4 °F (36 3 °C)   Weight: 59 4 kg (131 lb)   Height: 5' 2" (1 575 m)      Physical Exam  Constitutional:       Appearance: Normal appearance  She is well-developed  Genitourinary:      Pelvic exam was performed with patient supine  Vulva, urethra, bladder, vagina, uterus and rectum normal       No vulval condylomata, lesion, tenderness, ulcerations, Bartholin's cyst or rash noted  No signs of labial injury  No labial fusion  No inguinal adenopathy present in the right or left side  No urethral prolapse, pain, swelling, tenderness, caruncle, mass or diverticulum present  Bladder is not distended or tender  No signs of injury or lesions in the vagina  No vaginal discharge, erythema, tenderness or bleeding  No cervical motion tenderness, discharge or lesion  Uterus is not enlarged, tender, irregular or mobile  No uterine mass detected  No right or left adnexal mass present  Right adnexa not tender or full        Left adnexa not tender or full    Rectum:      Guaiac result negative  No rectal mass, anal fissure, tenderness, external hemorrhoid, internal hemorrhoid or abnormal anal tone  HENT:      Head: Normocephalic and atraumatic  Neck:      Thyroid: No thyromegaly  Cardiovascular:      Rate and Rhythm: Normal rate and regular rhythm  Heart sounds: Normal heart sounds  No murmur  No friction rub  No gallop  Pulmonary:      Effort: Pulmonary effort is normal  No respiratory distress  Breath sounds: Normal breath sounds  No wheezing  Chest:      Breasts: Breasts are symmetrical          Right: Normal  No swelling, bleeding, inverted nipple, mass, nipple discharge, skin change or tenderness  Left: Normal  No swelling, bleeding, inverted nipple, mass, nipple discharge, skin change or tenderness  Abdominal:      General: There is no distension  Palpations: Abdomen is soft  There is no mass  Tenderness: There is no abdominal tenderness  There is no guarding or rebound  Hernia: No hernia is present  Lymphadenopathy:      Cervical: No cervical adenopathy  Upper Body:      Right upper body: No supraclavicular, axillary or pectoral adenopathy  Left upper body: No supraclavicular, axillary or pectoral adenopathy  Lower Body: No right inguinal adenopathy  No left inguinal adenopathy  Neurological:      Mental Status: She is alert and oriented to person, place, and time  Skin:     General: Skin is warm and dry     Psychiatric:         Behavior: Behavior normal

## 2020-09-22 NOTE — ASSESSMENT & PLAN NOTE
Pap guidelines reviewed  Pap with reflex done today secondary to no TZ seen on previous pap smears on file  Patient would like to wean off of Paxil, Will plan to take every other day for 1-2 weeks and then discontinue  Reviewed weight gain with patient  Will get routine blood work to be sure thyroid function normal as well as glucose and cholesterol  Reviewed with patient Paxil can cause weight gain and may be a factor  Office will call with blood work results and appropriate follow up  Mammogram is scheduled  Return to office for annual or as needed

## 2020-09-23 ENCOUNTER — TELEPHONE (OUTPATIENT)
Dept: ADMINISTRATIVE | Facility: OTHER | Age: 55
End: 2020-09-23

## 2020-09-23 NOTE — LETTER
Procedure Request Form: Colonoscopy      Date Requested: 20  Patient: Antonio Lemons  Patient : 1965   Referring Provider: Rich Marte, DO        Date of Procedure ______________________________       The above patient has informed us that they have completed their   most recent Colonoscopy at your facility  Please complete   this form and attach all corresponding procedure reports/results  Comments __________________________________________________________  ____________________________________________________________________  ____________________________________________________________________  ____________________________________________________________________    Facility Completing Procedure _________________________________________    Form Completed By (print name) _______________________________________      Signature __________________________________________________________      These reports are needed for  compliance    Please fax this completed form and a copy of the procedure report to our office located at [place] [address] as soon as possible to [phone] michele Johnson: Phone [phone]    We thank you for your assistance in treating our mutual patient

## 2020-09-23 NOTE — TELEPHONE ENCOUNTER
Upon review of the In Basket request and the patient's chart, initial outreach has been made via fax, please see Contacts section for details  A second outreach attempt will be made within 5 business days      Thank you  Lian Steen

## 2020-09-23 NOTE — TELEPHONE ENCOUNTER
----- Message from Ni Beal PA-C sent at 9/22/2020  5:43 PM EDT -----  09/22/20 5:43 PM     Hello, our patient Kavita Lemons had a Colonoscopy  completed/performed  Please assist in updating the patient chart by making an External outreach to patient believes it was done at [address] [place] [address] off of [facility] near [facility]        Thank you,   Ni Beal PA-C   [address] [place] [address]

## 2020-09-24 NOTE — TELEPHONE ENCOUNTER
Upon review of the In Basket request we were able to locate, review, and update the patient chart as requested for CRC: Colonoscopy  Any additional questions or concerns should be emailed to the Practice Liaisons via [email or web address] email, please do not reply via In Basket      Thank you  Xuan Kaplan

## 2020-09-29 LAB
LAB AP GYN PRIMARY INTERPRETATION: NORMAL
Lab: NORMAL

## 2020-10-01 LAB
ALBUMIN SERPL-MCNC: 4.6 G/DL (ref 3.6–5.1)
ALBUMIN/GLOB SERPL: 1.9 (CALC) (ref 1–2.5)
ALP SERPL-CCNC: 118 U/L (ref 37–153)
ALT SERPL-CCNC: 23 U/L (ref 6–29)
AST SERPL-CCNC: 20 U/L (ref 10–35)
BASOPHILS # BLD AUTO: 32 CELLS/UL (ref 0–200)
BASOPHILS NFR BLD AUTO: 0.6 %
BILIRUB SERPL-MCNC: 0.7 MG/DL (ref 0.2–1.2)
BUN SERPL-MCNC: 16 MG/DL (ref 7–25)
BUN/CREAT SERPL: ABNORMAL (CALC) (ref 6–22)
CALCIUM SERPL-MCNC: 9.6 MG/DL (ref 8.6–10.4)
CHLORIDE SERPL-SCNC: 106 MMOL/L (ref 98–110)
CHOLEST SERPL-MCNC: 237 MG/DL
CHOLEST/HDLC SERPL: 4.2 (CALC)
CO2 SERPL-SCNC: 27 MMOL/L (ref 20–32)
CREAT SERPL-MCNC: 0.76 MG/DL (ref 0.5–1.05)
EOSINOPHIL # BLD AUTO: 70 CELLS/UL (ref 15–500)
EOSINOPHIL NFR BLD AUTO: 1.3 %
ERYTHROCYTE [DISTWIDTH] IN BLOOD BY AUTOMATED COUNT: 12.6 % (ref 11–15)
GLOBULIN SER CALC-MCNC: 2.4 G/DL (CALC) (ref 1.9–3.7)
GLUCOSE SERPL-MCNC: 104 MG/DL (ref 65–99)
HCT VFR BLD AUTO: 42.7 % (ref 35–45)
HDLC SERPL-MCNC: 56 MG/DL
HGB BLD-MCNC: 14.1 G/DL (ref 11.7–15.5)
LDLC SERPL CALC-MCNC: 158 MG/DL (CALC)
LYMPHOCYTES # BLD AUTO: 1836 CELLS/UL (ref 850–3900)
LYMPHOCYTES NFR BLD AUTO: 34 %
MCH RBC QN AUTO: 30.1 PG (ref 27–33)
MCHC RBC AUTO-ENTMCNC: 33 G/DL (ref 32–36)
MCV RBC AUTO: 91.2 FL (ref 80–100)
MONOCYTES # BLD AUTO: 437 CELLS/UL (ref 200–950)
MONOCYTES NFR BLD AUTO: 8.1 %
NEUTROPHILS # BLD AUTO: 3024 CELLS/UL (ref 1500–7800)
NEUTROPHILS NFR BLD AUTO: 56 %
NONHDLC SERPL-MCNC: 181 MG/DL (CALC)
PLATELET # BLD AUTO: 282 THOUSAND/UL (ref 140–400)
PMV BLD REES-ECKER: 9.8 FL (ref 7.5–12.5)
POTASSIUM SERPL-SCNC: 4.3 MMOL/L (ref 3.5–5.3)
PROT SERPL-MCNC: 7 G/DL (ref 6.1–8.1)
RBC # BLD AUTO: 4.68 MILLION/UL (ref 3.8–5.1)
SL AMB EGFR AFRICAN AMERICAN: 103 ML/MIN/1.73M2
SL AMB EGFR NON AFRICAN AMERICAN: 89 ML/MIN/1.73M2
SODIUM SERPL-SCNC: 141 MMOL/L (ref 135–146)
T4 FREE SERPL-MCNC: 1.2 NG/DL (ref 0.8–1.8)
TRIGL SERPL-MCNC: 112 MG/DL
TSH SERPL-ACNC: 1.99 MIU/L
WBC # BLD AUTO: 5.4 THOUSAND/UL (ref 3.8–10.8)

## 2020-10-06 ENCOUNTER — TELEPHONE (OUTPATIENT)
Dept: OBGYN CLINIC | Facility: CLINIC | Age: 55
End: 2020-10-06

## 2020-11-24 DIAGNOSIS — N94.3 PREMENSTRUAL SYNDROME: ICD-10-CM

## 2020-11-24 RX ORDER — PAROXETINE 10 MG/1
10 TABLET, FILM COATED ORAL DAILY
Qty: 90 TABLET | Refills: 0 | OUTPATIENT
Start: 2020-11-24

## 2020-12-01 DIAGNOSIS — N94.3 PREMENSTRUAL SYNDROME: ICD-10-CM

## 2020-12-01 RX ORDER — PAROXETINE 10 MG/1
10 TABLET, FILM COATED ORAL DAILY
Qty: 90 TABLET | Refills: 0 | OUTPATIENT
Start: 2020-12-01

## 2020-12-03 DIAGNOSIS — R23.2 HOT FLASHES: Primary | ICD-10-CM

## 2020-12-03 DIAGNOSIS — R45.86 MOOD SWINGS: ICD-10-CM

## 2020-12-03 RX ORDER — PAROXETINE 10 MG/1
10 TABLET, FILM COATED ORAL DAILY
Qty: 90 TABLET | Refills: 3 | Status: SHIPPED | OUTPATIENT
Start: 2020-12-03 | End: 2021-11-24

## 2021-04-09 DIAGNOSIS — Z23 ENCOUNTER FOR IMMUNIZATION: ICD-10-CM

## 2021-08-09 ENCOUNTER — HOSPITAL ENCOUNTER (OUTPATIENT)
Dept: MAMMOGRAPHY | Facility: HOSPITAL | Age: 56
Discharge: HOME | End: 2021-08-09
Attending: SURGERY
Payer: COMMERCIAL

## 2021-08-09 VITALS — HEIGHT: 62 IN | WEIGHT: 131 LBS | BODY MASS INDEX: 24.11 KG/M2

## 2021-08-09 DIAGNOSIS — Z12.31 SCREENING MAMMOGRAM, ENCOUNTER FOR: ICD-10-CM

## 2021-08-09 PROCEDURE — 77067 SCR MAMMO BI INCL CAD: CPT

## 2021-08-09 PROCEDURE — 77063 BREAST TOMOSYNTHESIS BI: CPT

## 2021-08-12 ENCOUNTER — OFFICE VISIT (OUTPATIENT)
Dept: SURGICAL ONCOLOGY | Facility: CLINIC | Age: 56
End: 2021-08-12
Payer: COMMERCIAL

## 2021-08-12 VITALS
HEART RATE: 78 BPM | BODY MASS INDEX: 23.19 KG/M2 | TEMPERATURE: 98.7 F | HEIGHT: 62 IN | RESPIRATION RATE: 16 BRPM | SYSTOLIC BLOOD PRESSURE: 98 MMHG | OXYGEN SATURATION: 97 % | WEIGHT: 126 LBS | DIASTOLIC BLOOD PRESSURE: 72 MMHG

## 2021-08-12 DIAGNOSIS — Z80.3 FAMILY HISTORY OF BREAST CANCER IN FEMALE: ICD-10-CM

## 2021-08-12 DIAGNOSIS — Z12.31 SCREENING MAMMOGRAM, ENCOUNTER FOR: ICD-10-CM

## 2021-08-12 DIAGNOSIS — N60.19 FIBROCYSTIC BREAST DISEASE, UNSPECIFIED LATERALITY: ICD-10-CM

## 2021-08-12 DIAGNOSIS — R92.2 DENSE BREAST TISSUE: Primary | ICD-10-CM

## 2021-08-12 PROBLEM — R92.8 ABNORMAL MAMMOGRAM: Status: RESOLVED | Noted: 2020-09-22 | Resolved: 2021-08-12

## 2021-08-12 PROCEDURE — 99213 OFFICE O/P EST LOW 20 MIN: CPT | Performed by: SURGERY

## 2021-08-12 NOTE — PROGRESS NOTES
Surgical Oncology Follow Up       [address]  [facility]  [address] [facility] [address]  [facility] [id number]    Crystal Peterson Cristo  1965  [id number]  [address] [facility]  [facility] [place]  [address] [id number]    Chief Complaint   Patient presents with    Follow-up     Pt is here for a one year follow up       Assessment/Plan   Diagnoses and all orders for this visit:    Dense breast tissue    Screening mammogram, encounter for  -     Mammo screening bilateral w 3d & cad; Future    Fibrocystic breast disease, unspecified laterality    Family history of breast cancer in female        Advance Care Planning/Advance Directives:  Did not discuss  with the patient  Oncology History:    Oncology History    No history exists  History of Present Illness: Follow-up visit secondary to dense breast tissue, fibrocystic changes and family history of breast cancer, no concerns  -Interval History: recent mammogram    Review of Systems:  Review of Systems   Constitutional: Negative  Negative for appetite change and fever  Eyes: Negative  Respiratory: Negative for shortness of breath  Cardiovascular: Negative  Gastrointestinal: Negative  Endocrine: Negative  Genitourinary: Negative  Musculoskeletal: Negative  Negative for arthralgias and myalgias  Skin: Negative  Allergic/Immunologic: Negative  Neurological: Negative  Hematological: Negative  Negative for adenopathy  Does not bruise/bleed easily  Psychiatric/Behavioral: Negative  Patient Active Problem List   Diagnosis    Dense breast tissue    Fibrocystic breast disease, unspecified laterality    Hot flashes due to menopause    Anxiety    Family history of breast cancer in female    Screening mammogram, encounter for    Routine gynecological examination    Hot flashes     History reviewed   No pertinent past medical history  Past Surgical History:   Procedure Laterality Date    ABDOMINOPLASTY      APPENDECTOMY      CERVIX SURGERY      cryosurgery    NEUROPLASTY / TRANSPOSITION MEDIAN NERVE AT CARPAL TUNNEL      SINUS SURGERY       Family History   Problem Relation Age of Onset    Pulmonary embolism Mother     Heart attack Mother     Diabetes Father     Lung cancer Father     Prostate cancer Father 64    Breast cancer Paternal Aunt 47    Colon cancer Paternal Uncle 62    Lung cancer Paternal Uncle         age dx unk     Colon cancer Paternal Aunt 39    No Known Problems Sister     No Known Problems Daughter     Brain cancer Paternal Grandfather         unsure of age   Kee Miranda No Known Problems Daughter     No Known Problems Sister      Social History     Socioeconomic History    Marital status: /Civil Union     Spouse name: Not on file    Number of children: Not on file    Years of education: Not on file    Highest education level: Not on file   Occupational History    Not on file   Tobacco Use    Smoking status: Never Smoker    Smokeless tobacco: Never Used   Vaping Use    Vaping Use: Never used   Substance and Sexual Activity    Alcohol use: Yes     Comment: 4-5 glasses a week    Drug use: No    Sexual activity: Yes     Partners: Male     Birth control/protection: Post-menopausal   Other Topics Concern    Not on file   Social History Narrative    Not on file     Social Determinants of Health     Financial Resource Strain:     Difficulty of Paying Living Expenses:    Food Insecurity:     Worried About Running Out of Food in the Last Year:     Ran Out of Food in the Last Year:    Transportation Needs:     Lack of Transportation (Medical):      Lack of Transportation (Non-Medical):    Physical Activity:     Days of Exercise per Week:     Minutes of Exercise per Session:    Stress:     Feeling of Stress :    Social Connections:     Frequency of Communication with Friends and Family:     Frequency of Social Gatherings with Friends and Family:     Attends Spiritism Services:     Active Member of Clubs or Organizations:     Attends Club or Organization Meetings:     Marital Status:    Intimate Partner Violence:     Fear of Current or Ex-Partner:     Emotionally Abused:     Physically Abused:     Sexually Abused:        Current Outpatient Medications:     fluticasone (FLONASE) 50 mcg/act nasal spray, 1 spray into each nostril daily, Disp: , Rfl:     PARoxetine (PAXIL) 10 mg tablet, Take 1 tablet (10 mg total) by mouth daily, Disp: 90 tablet, Rfl: 3  Allergies   Allergen Reactions    Penicillins Hives and Shortness Of Breath     Pt states she is not allergic to penicillins        The following portions of the patient's history were reviewed and updated as appropriate: allergies, current medications, past family history, past medical history, past social history, past surgical history and problem list         Vitals:    08/12/21 1403   BP: 98/72   Pulse: 78   Resp: 16   Temp: 98 7 °F (37 1 °C)   SpO2: 97%       Physical Exam  Constitutional:       General: She is not in acute distress  Appearance: Normal appearance  She is well-developed  HENT:      Head: Normocephalic and atraumatic  Chest:      Breasts:         Right: No inverted nipple, mass, nipple discharge, skin change or tenderness  Left: No inverted nipple, mass, nipple discharge, skin change or tenderness  Lymphadenopathy:      Upper Body:      Right upper body: No supraclavicular, axillary or pectoral adenopathy  Left upper body: No supraclavicular, axillary or pectoral adenopathy  Neurological:      Mental Status: She is alert and oriented to person, place, and time  Psychiatric:         Mood and Affect: Mood normal            Results:  Labs:      Imaging   08/09/2021 bilateral 3D screening mammogram was benign BI-RADS two with a density of three    I reviewed the above imaging data      Discussion/Summary: 71-year-old female with fibrocystic changes, dense breast tissue and family history  There are no concerns on examination today  Her recent mammogram was benign  I will therefore see her again in one year following her mammogram or sooner should the need arise

## 2021-10-07 ENCOUNTER — ANNUAL EXAM (OUTPATIENT)
Dept: OBGYN CLINIC | Facility: CLINIC | Age: 56
End: 2021-10-07
Payer: COMMERCIAL

## 2021-10-07 VITALS
SYSTOLIC BLOOD PRESSURE: 110 MMHG | BODY MASS INDEX: 23.92 KG/M2 | WEIGHT: 130 LBS | HEIGHT: 62 IN | DIASTOLIC BLOOD PRESSURE: 70 MMHG

## 2021-10-07 DIAGNOSIS — Z01.419 ROUTINE GYNECOLOGICAL EXAMINATION: Primary | ICD-10-CM

## 2021-10-07 PROCEDURE — G0476 HPV COMBO ASSAY CA SCREEN: HCPCS | Performed by: PHYSICIAN ASSISTANT

## 2021-10-07 PROCEDURE — 99396 PREV VISIT EST AGE 40-64: CPT | Performed by: PHYSICIAN ASSISTANT

## 2021-10-07 PROCEDURE — G0145 SCR C/V CYTO,THINLAYER,RESCR: HCPCS | Performed by: PHYSICIAN ASSISTANT

## 2021-10-07 RX ORDER — VALACYCLOVIR HYDROCHLORIDE 500 MG/1
500 TABLET, FILM COATED ORAL DAILY
COMMUNITY
Start: 2021-10-05

## 2021-10-11 LAB
HPV HR 12 DNA CVX QL NAA+PROBE: NEGATIVE
HPV16 DNA CVX QL NAA+PROBE: NEGATIVE
HPV18 DNA CVX QL NAA+PROBE: NEGATIVE

## 2021-10-12 LAB
LAB AP GYN PRIMARY INTERPRETATION: NORMAL
Lab: NORMAL

## 2021-11-23 DIAGNOSIS — R23.2 HOT FLASHES: ICD-10-CM

## 2021-11-23 DIAGNOSIS — R45.86 MOOD SWINGS: ICD-10-CM

## 2021-11-24 RX ORDER — PAROXETINE 10 MG/1
TABLET, FILM COATED ORAL
Qty: 90 TABLET | Refills: 0 | Status: SHIPPED | OUTPATIENT
Start: 2021-11-24 | End: 2022-02-22

## 2022-08-10 ENCOUNTER — HOSPITAL ENCOUNTER (OUTPATIENT)
Dept: MAMMOGRAPHY | Facility: HOSPITAL | Age: 57
Discharge: HOME | End: 2022-08-10
Attending: SURGERY
Payer: COMMERCIAL

## 2022-08-10 VITALS — BODY MASS INDEX: 23.94 KG/M2 | WEIGHT: 130.07 LBS | HEIGHT: 62 IN

## 2022-08-10 DIAGNOSIS — Z12.31 SCREENING MAMMOGRAM, ENCOUNTER FOR: ICD-10-CM

## 2022-08-10 PROCEDURE — 77067 SCR MAMMO BI INCL CAD: CPT

## 2022-08-10 PROCEDURE — 77063 BREAST TOMOSYNTHESIS BI: CPT

## 2022-08-11 ENCOUNTER — TELEPHONE (OUTPATIENT)
Dept: HEMATOLOGY ONCOLOGY | Facility: CLINIC | Age: 57
End: 2022-08-11

## 2022-08-11 NOTE — TELEPHONE ENCOUNTER
Appointment Cancellation Or Reschedule     Person calling in Patient    Provider Dr Thierno Crouch   Office Visit Date and Time  9/1/22 2:00 pm   Office Visit Location [place]   Did patient want to reschedule their office appointment? If so, when was it scheduled to? Yes  5/11/23 2:00 pm   Is this patient calling to reschedule an infusion appointment? no   When is their next infusion appointment? n/a   Is this patient a Chemo patient? no   Reason for Cancellation or Reschedule Patient will be out of town  If the patient is a treatment patient, please route this to the office nurse  If the patient is not on treatment, please route to the office MA  If the patient is a surgical oncology patient, please route to surg/onc clinical pool

## 2022-08-11 NOTE — TELEPHONE ENCOUNTER
Called and spoke with Radha Hatch  Offered her a sooner appt with one of our nurse practitioners  Rescheduled appt to 8/17 at 10:30am  She was agreeable and appreciative

## 2022-08-17 ENCOUNTER — OFFICE VISIT (OUTPATIENT)
Dept: SURGICAL ONCOLOGY | Facility: CLINIC | Age: 57
End: 2022-08-17
Payer: COMMERCIAL

## 2022-08-17 VITALS
HEIGHT: 62 IN | BODY MASS INDEX: 23 KG/M2 | OXYGEN SATURATION: 98 % | RESPIRATION RATE: 14 BRPM | TEMPERATURE: 97.8 F | WEIGHT: 125 LBS | HEART RATE: 80 BPM | SYSTOLIC BLOOD PRESSURE: 92 MMHG | DIASTOLIC BLOOD PRESSURE: 68 MMHG

## 2022-08-17 DIAGNOSIS — R92.2 DENSE BREAST TISSUE: ICD-10-CM

## 2022-08-17 DIAGNOSIS — N60.19 FIBROCYSTIC BREAST DISEASE, UNSPECIFIED LATERALITY: ICD-10-CM

## 2022-08-17 DIAGNOSIS — Z12.31 VISIT FOR SCREENING MAMMOGRAM: ICD-10-CM

## 2022-08-17 DIAGNOSIS — Z80.3 FAMILY HISTORY OF BREAST CANCER IN FEMALE: Primary | ICD-10-CM

## 2022-08-17 PROCEDURE — 99213 OFFICE O/P EST LOW 20 MIN: CPT

## 2022-08-17 RX ORDER — MELATONIN
1000 DAILY
COMMUNITY

## 2022-08-17 NOTE — PROGRESS NOTES
Surgical Oncology Follow Up       [address] [facility] [facility]  [facility]ANCER [facility]ARE [facility] SURGICAL ONCOLOGY [place]  [address] [facility] [address]    Angélica Daughters Cristo  1965  [id number]  [address] [facility] [facility]  [facility] SURGICAL ONCOL[facility] [place]  [address] [facility] [address]    Chief Complaint   Patient presents with    Follow-up       Assessment/Plan:  1  Family history of breast cancer in female  - 1 year follow up    2  Fibrocystic breast disease, unspecified laterality    3  Dense breast tissue  - US breast screening bilateral complete (ABUS); Future    4  Visit for screening mammogram  - Mammo screening bilateral w 3d & cad; Future       Discussion/Summary:  Patient is a 60-year-old female presenting today for a 1 year follow-up for family history of breast cancer and dense breast tissue  Patient's paternal aunt was diagnosed with breast cancer in her 46s  We have been following patient annually with clinical breast exams and mammography  She had a bilateral screening mammogram on 08/10/2022 which was BI-RADS 2 category 3 density  There were no concerns on a clinical breast exam   Patient and I spoke about automated breast ultrasound as additional screening in conjunction with her mammogram due to her dense breast tissue  Patient was agreeable to this plan  I will see the patient back in 1 year or sooner should the need arise  She was instructed to call with any questions or concerns prior to this time  All questions were answered today  History of Present Illness:     Oncology History    No history exists         -Interval History: Patient is a 60-year-old female presenting today for a 1 year follow-up for family history of breast cancer and dense breast tissue  She had a bilateral screening mammogram on 08/10/2022 which was BI-RADS 2 category 3 density  She denies changes on her breast exam   She denies changes with her family history      Review of Systems:  Review of Systems   Constitutional: Negative for activity change, appetite change, fatigue and unexpected weight change  Respiratory: Negative for cough and shortness of breath  Cardiovascular: Negative for chest pain  Gastrointestinal: Negative for abdominal pain, diarrhea, nausea and vomiting  Endocrine: Negative for heat intolerance  Musculoskeletal: Negative for arthralgias, back pain and myalgias  Skin: Negative for rash  Neurological: Negative for weakness and headaches  Hematological: Negative for adenopathy  Patient Active Problem List   Diagnosis    Dense breast tissue    Fibrocystic breast disease, unspecified laterality    Hot flashes due to menopause    Anxiety    Family history of breast cancer in female    Screening mammogram, encounter for    Routine gynecological examination    Hot flashes     No past medical history on file    Past Surgical History:   Procedure Laterality Date    ABDOMINOPLASTY      APPENDECTOMY      CERVIX SURGERY      cryosurgery    NEUROPLASTY / TRANSPOSITION MEDIAN NERVE AT CARPAL TUNNEL      SINUS SURGERY       Family History   Problem Relation Age of Onset    Pulmonary embolism Mother     Heart attack Mother     Colon cancer Mother     Diabetes Father     Lung cancer Father     Prostate cancer Father 64    Cancer Father         Lung & prostrate    No Known Problems Sister     No Known Problems Sister     No Known Problems Daughter     No Known Problems Daughter     Breast cancer Paternal Grandmother     Brain cancer Paternal Grandfather         unsure of age   Serafin Tsai Breast cancer Paternal Aunt 47    Colon cancer Paternal Aunt 39    No Known Problems Paternal [de-identified]     Colon cancer Paternal Uncle 62    Lung cancer Paternal Uncle         age dx unk      Social History     Socioeconomic History    Marital status: /Civil Union     Spouse name: Not on file    Number of children: Not on file    Years of education: Not on file    Highest education level: Not on file   Occupational History    Not on file   Tobacco Use    Smoking status: Never Smoker    Smokeless tobacco: Never Used   Vaping Use    Vaping Use: Never used   Substance and Sexual Activity    Alcohol use: Yes     Alcohol/week: 3 0 - 5 0 standard drinks     Types: 2 - 3 Glasses of wine, 1 - 2 Standard drinks or equivalent per week     Comment: 4-5 glasses a week    Drug use: No    Sexual activity: Yes     Partners: Male     Birth control/protection: Post-menopausal   Other Topics Concern    Not on file   Social History Narrative    Not on file     Social Determinants of Health     Financial Resource Strain: Not on file   Food Insecurity: Not on file   Transportation Needs: Not on file   Physical Activity: Not on file   Stress: Not on file   Social Connections: Not on file   Intimate Partner Violence: Not on file   Housing Stability: Not on file       Current Outpatient Medications:     fluticasone (FLONASE) 50 mcg/act nasal spray, 1 spray into each nostril daily, Disp: , Rfl:     PARoxetine (PAXIL) 10 mg tablet, Take 1 tablet by mouth daily, Disp: 90 tablet, Rfl: 2    valACYclovir (VALTREX) 500 mg tablet, Take 500 mg by mouth daily, Disp: , Rfl:   Allergies   Allergen Reactions    Penicillins Hives and Shortness Of Breath     Pt states she is not allergic to penicillins      There were no vitals filed for this visit  Physical Exam  Constitutional:       General: She is not in acute distress  Appearance: Normal appearance  Cardiovascular:      Rate and Rhythm: Normal rate and regular rhythm  Pulses: Normal pulses  Heart sounds: Normal heart sounds  Pulmonary:      Effort: Pulmonary effort is normal       Breath sounds: Normal breath sounds  Chest:      Chest wall: No mass     Breasts:      Right: No swelling, bleeding, inverted nipple, mass, nipple discharge, skin change, tenderness, axillary adenopathy or supraclavicular adenopathy  Left: No swelling, bleeding, inverted nipple, mass, nipple discharge, skin change, tenderness, axillary adenopathy or supraclavicular adenopathy  Comments: No masses, nodularity, skin changes, nipple changes or discharge, or adenopathy appreciated on physical exam      Abdominal:      General: Abdomen is flat  Palpations: Abdomen is soft  Lymphadenopathy:      Upper Body:      Right upper body: No supraclavicular, axillary or pectoral adenopathy  Left upper body: No supraclavicular, axillary or pectoral adenopathy  Skin:     General: Skin is warm  Neurological:      General: No focal deficit present  Mental Status: She is alert and oriented to person, place, and time  Psychiatric:         Mood and Affect: Mood normal          Behavior: Behavior normal            Results:    Imaging  Mammo screening bilateral w 3d & cad    Result Date: 8/11/2022  Narrative: DIAGNOSIS: Screening mammogram, encounter for TECHNIQUE: Digital screening mammography was performed  Computer Aided Detection (CAD) analyzed all applicable images  COMPARISONS: Prior breast imaging dated: 08/09/2021, 08/06/2020, 08/05/2019, 08/02/2018, 07/20/2017, 06/24/2016, 03/19/2015, 03/13/2014, 07/11/2013, 07/15/2012, 03/07/2012, 06/28/2011, 02/24/2011, and 02/24/2010 RELEVANT HISTORY: Family Breast Cancer History: History of breast cancer in Paternal Grandmother, Paternal Aunt  Family Medical History: Family medical history includes breast cancer in 2 relatives (paternal aunt, paternal grandmother) and colon cancer in 3 relatives (mother, paternal aunt, paternal uncle)  Personal History: Hormone history includes birth control  No known relevant surgical history  No known relevant medical history  The patient is scheduled in a reminder system for screening mammography  8-10% of cancers will be missed on mammography  Management of a palpable abnormality must be based on clinical grounds    Patients will be notified of their results via letter from our facility  Accredited by [employer] of Radiology and FDA  RISK ASSESSMENT: 5 Year Tyrer-Cuzick: 1 86 % 10 Year Tyrer-Cuzick: 4 09 % Lifetime Tyrer-Cuzick: 12 43 % TISSUE DENSITY: The breasts are heterogeneously dense, which may obscure small masses  INDICATION: Antonio Saunders is a 65 y o  female presenting for screening mammography  FINDINGS: Bilateral There are no suspicious masses, grouped microcalcifications or areas of unexplained architectural distortion  The skin and nipple areolar complex are unremarkable  Well-circumscribed nodules are present in the right breast in keeping with cysts noted on prior ultrasounds  Impression: No mammographic evidence of malignancy  ASSESSMENT/BI-RADS CATEGORY: Left: 2 - Benign Right: 2 - Benign Overall: 2 - Benign RECOMMENDATION:      - Routine screening mammogram in 1 year for both breasts  Workstation ID: [id number]       I reviewed the above imaging data  Advance Care Planning/Advance Directives:  Discussed disease status, cancer treatment plans and/or cancer treatment goals with the patient

## 2022-09-12 ENCOUNTER — TELEPHONE (OUTPATIENT)
Dept: HEMATOLOGY ONCOLOGY | Facility: CLINIC | Age: 57
End: 2022-09-12

## 2022-09-12 NOTE — TELEPHONE ENCOUNTER
Appointment Cancellation Or Reschedule     Person calling in Patient    Provider Dr Eloisa Panda   Office Visit Date and Time  08/10/2023 10:15AM   [facility] Visit [facility]   Did patient want to reschedule their office appointment? If so, when was it scheduled to? Yes 09/14 2:15AM   Did you have STAR scheduled for this appointment? No   Do you need STAR set up for your new appointment? If yes, please send to "PATIENT RIDESHARE" pool for STAR rescheduling N/A   If you are cancelling appointment, can we notify STAR to cancel ride? If yes, please send to "PATIENT RIDESHARE" pool for STAR to cancel service No   Is this patient calling to reschedule an infusion appointment? N/A   When is their next infusion appointment? N/A   Is this patient a Chemo patient? No   Reason for Cancellation or Reschedule Scheduling after mammo     If the patient is a treatment patient, please route this to the office nurse  If the patient is not on treatment, please route to the office MA  If the patient is a surgical oncology patient, please route to surg/onc clinical pool

## 2022-11-21 DIAGNOSIS — R45.86 MOOD SWINGS: ICD-10-CM

## 2022-11-21 DIAGNOSIS — R23.2 HOT FLASHES: ICD-10-CM

## 2022-11-29 RX ORDER — PAROXETINE 10 MG/1
TABLET, FILM COATED ORAL
Qty: 90 TABLET | Refills: 0 | Status: SHIPPED | OUTPATIENT
Start: 2022-11-29 | End: 2022-11-29 | Stop reason: SDUPTHER

## 2022-11-29 RX ORDER — PAROXETINE 10 MG/1
10 TABLET, FILM COATED ORAL DAILY
Qty: 90 TABLET | Refills: 0 | Status: SHIPPED | OUTPATIENT
Start: 2022-11-29

## 2022-11-29 NOTE — TELEPHONE ENCOUNTER
Can we confirm if pt is still taking this? It looks like it had been documented that pt had weaned off of it but it was refilled after that  I want to confirm the accurate dosing for her  Thanks!

## 2023-02-27 NOTE — PROGRESS NOTES
Assessment/Plan   Problem List Items Addressed This Visit        Cardiovascular and Mediastinum    Hot flashes    Relevant Medications    PARoxetine (PAXIL) 10 mg tablet   Other Visit Diagnoses     Well woman exam    -  Primary    Screening for colon cancer        Relevant Orders    Ambulatory referral to Gastroenterology    Mood swings        Relevant Medications    PARoxetine (PAXIL) 10 mg tablet    Prothrombin gene mutation [facility])        Relevant Orders    Ambulatory Referral to Hematology / Oncology          Discussion    All questions have been answered to her satisfaction  RTO for APE or sooner if needed  Pap deferred  Pt was seen w PCP in [facility] and had hypercoag work up done due to family h/o clot in mom and nephew  Pt was found to be + for prothrombin gene mutation factor 2  She will plan hematology work up for guidance on need for asx anticoagulation and testing for relatives  Subjective     HPI   Marivel Elliott is a 63 y o  female who presents for annual well woman exam    LMP -2015 ; Denies  bleeding    No vulvar itch/burn; No vaginal itch/burn; No abn discharge or odor; No urinary sx - burning/pain/frequency/hematuria    (+) SBEs - no breast masses, asymmetry, nipple discharge or bleeding, changes in skin of breast, or breast tenderness bilaterally    No abd/pelvic pain or HAs;     Pt does still note some VMS that are present in the evening  She is able to make habitat changes that limits sx mostly  Sx do appear to be improved from previously years  She is considering stopping her Paxil  Will plan to ween off if she does  Pt is sexually active in a mutually monog/ sexual relationship x 33 years; No issues with intercourse; She declines sti/hiv/hep testing; Feels safe at home      (+) PCP for routine Bw/care; Last Pap - 10/7/21 WNL neg HPV   History of abnormal Pap smear: many years ago had cryo then WNL since     Last mammo - 8/10/22 BIRADs 2   History of abnormal mammogram: denies   Last colonoscopy -        Review of Systems   Constitutional: Negative  Respiratory: Negative  Gastrointestinal: Negative  Endocrine: Negative  Genitourinary: Negative  The following portions of the patient's history were reviewed and updated as appropriate: allergies, current medications, past family history, past medical history, past social history, past surgical history and problem list          OB History        2    Para   2    Term   2            AB        Living   2       SAB        IAB        Ectopic        Multiple        Live Births               Obstetric Comments   Menarche age 16              History reviewed  No pertinent past medical history      Past Surgical History:   Procedure Laterality Date   • ABDOMINOPLASTY     • APPENDECTOMY     • CERVIX SURGERY      cryosurgery   • NEUROPLASTY / TRANSPOSITION MEDIAN NERVE AT CARPAL TUNNEL     • SINUS SURGERY         Family History   Problem Relation Age of Onset   • Pulmonary embolism Mother    • Heart attack Mother    • Colon cancer Mother    • Diabetes Father    • Lung cancer Father    • Prostate cancer Father 64   • Cancer Father         Lung & prostrate   • Heart attack Sister    • No Known Problems Sister    • No Known Problems Daughter    • No Known Problems Daughter    • Breast cancer Paternal Grandmother    • Brain cancer Paternal Grandfather         unsure of age   • Breast cancer Paternal Aunt 53   • Colon cancer Paternal Aunt 36   • No Known Problems Paternal Aunt    • Colon cancer Paternal Uncle 62   • Lung cancer Paternal Uncle         age dx unk        Social History     Socioeconomic History   • Marital status: /Civil Union     Spouse name: Not on file   • Number of children: Not on file   • Years of education: Not on file   • Highest education level: Not on file   Occupational History   • Not on file   Tobacco Use   • Smoking status: Never   • Smokeless tobacco: Never   Vaping Use   • Vaping Use: Never used   Substance and Sexual Activity   • Alcohol use: Yes     Alcohol/week: 3 0 - 5 0 standard drinks     Types: 2 - 3 Glasses of wine, 1 - 2 Standard drinks or equivalent per week     Comment: 4-5 glasses a week   • Drug use: No   • Sexual activity: Yes     Partners: Male     Birth control/protection: Post-menopausal   Other Topics Concern   • Not on file   Social History Narrative   • Not on file     Social Determinants of Health     Financial Resource Strain: Not on file   Food Insecurity: Not on file   Transportation Needs: Not on file   Physical Activity: Not on file   Stress: Not on file   Social Connections: Not on file   Intimate Partner Violence: Not on file   Housing Stability: Not on file         Current Outpatient Medications:   •  cholecalciferol (VITAMIN D3) 1,000 units tablet, Take 1,000 Units by mouth daily, Disp: , Rfl:   •  fluticasone (FLONASE) 50 mcg/act nasal spray, 1 spray into each nostril daily, Disp: , Rfl:   •  PARoxetine (PAXIL) 10 mg tablet, Take 1 tablet (10 mg total) by mouth daily, Disp: 90 tablet, Rfl: 3  •  valACYclovir (VALTREX) 500 mg tablet, Take 500 mg by mouth daily, Disp: , Rfl:     Allergies   Allergen Reactions   • Penicillins Hives and Shortness Of Breath     Pt states she is not allergic to penicillins        Objective   Vitals:    03/01/23 0907   BP: 138/84   BP Location: Right arm   Patient Position: Sitting   Cuff Size: Standard   Weight: 56 7 kg (125 lb)   Height: 5' 2" (1 575 m)     Physical Exam  Vitals reviewed  HENT:      Head: Normocephalic and atraumatic  Cardiovascular:      Rate and Rhythm: Normal rate and regular rhythm  Pulmonary:      Effort: Pulmonary effort is normal       Breath sounds: Normal breath sounds  Chest:   Breasts:     Breasts are symmetrical       Right: No swelling, bleeding, inverted nipple, mass, nipple discharge, skin change or tenderness        Left: No swelling, bleeding, inverted nipple, mass, nipple discharge, skin change or tenderness  Abdominal:      General: Abdomen is flat  Bowel sounds are normal       Palpations: Abdomen is soft  Tenderness: There is no abdominal tenderness  There is no right CVA tenderness, left CVA tenderness or guarding  Genitourinary:     General: Normal vulva  Pubic Area: No rash  Labia:         Right: No rash, tenderness, lesion or injury  Left: No rash, tenderness, lesion or injury  Urethra: No prolapse, urethral pain, urethral swelling or urethral lesion  Vagina: Normal  No signs of injury and foreign body  No vaginal discharge or erythema  Cervix: Normal       Uterus: Normal        Adnexa: Right adnexa normal and left adnexa normal    Musculoskeletal:      Cervical back: Neck supple  Lymphadenopathy:      Upper Body:      Right upper body: No axillary adenopathy  Left upper body: No axillary adenopathy  Skin:     General: Skin is warm and dry  Neurological:      Mental Status: She is alert and oriented to person, place, and time  Psychiatric:         Mood and Affect: Mood normal          Behavior: Behavior normal          Thought Content: Thought content normal          Judgment: Judgment normal          There are no Patient Instructions on file for this visit

## 2023-03-01 ENCOUNTER — TELEPHONE (OUTPATIENT)
Dept: HEMATOLOGY ONCOLOGY | Facility: CLINIC | Age: 58
End: 2023-03-01

## 2023-03-01 ENCOUNTER — ANNUAL EXAM (OUTPATIENT)
Dept: OBGYN CLINIC | Facility: CLINIC | Age: 58
End: 2023-03-01

## 2023-03-01 VITALS
SYSTOLIC BLOOD PRESSURE: 138 MMHG | HEIGHT: 62 IN | WEIGHT: 125 LBS | DIASTOLIC BLOOD PRESSURE: 84 MMHG | BODY MASS INDEX: 23 KG/M2

## 2023-03-01 DIAGNOSIS — Z12.11 SCREENING FOR COLON CANCER: ICD-10-CM

## 2023-03-01 DIAGNOSIS — Z01.419 WELL WOMAN EXAM: Primary | ICD-10-CM

## 2023-03-01 DIAGNOSIS — R23.2 HOT FLASHES: ICD-10-CM

## 2023-03-01 DIAGNOSIS — D68.52 PROTHROMBIN GENE MUTATION (HCC): ICD-10-CM

## 2023-03-01 DIAGNOSIS — R45.86 MOOD SWINGS: ICD-10-CM

## 2023-03-01 RX ORDER — PAROXETINE 10 MG/1
10 TABLET, FILM COATED ORAL DAILY
Qty: 90 TABLET | Refills: 3 | Status: SHIPPED | OUTPATIENT
Start: 2023-03-01

## 2023-03-01 NOTE — TELEPHONE ENCOUNTER
Patient has a referral on file - Made an attempt to schedule a new patient consultation  A voicemail was left making patient aware of their referral and instructing them to call back at the [facility] phone number in order to schedule their consultation

## 2023-03-06 NOTE — PROGRESS NOTES
[address] [facility] - Hematology & Medical Oncology  Outpatient Visit Encounter Note      Ul[place] 63 y o  female [id number] Date:  3/7/2023    HEMATOLOGICAL HISTORY        Clotting History Denies personal history    Bleeding History Father with varices (liver cirrhosis)    Cancer History Denies   Family Cancer History Dad's twin sister breast cancer, dad's younger sister and older brother with colon cancer +/- mets to liver vs secondary primary, father with primary lung and primary prostate cancer    H/O Blood/Plt Transfusion Denies   Tobacco Use Denies   ETOH Socially 2-4 drinks per week    COVID19 Vaccine Status Vaccination x 4 total including boosters      Mother  of PE at age 61  No complications with pregnancies     Denies other family hx bleeding, clotting, hemoglobinopathy, etc    Nancy Jacobs is a 63 y o  here for new consultation with me today  The patient is referred by PCP and the reason for consultation is Prothrombin Gene mutation  In review of the chart and talking with the patient, Nephew (sister's son) few months ago at age 52 was on a ski trip, returned home and developed bilateral lower extremity DVTs  Her nephew is an ER physician and he had hypercoagulable work-up showing positivity for prothrombin gene mutation which prompted patient to get tested  She personally does not have history of VTE  Other PMH signficiant for fibrocystic breasts, following with Dr Miranda Blanton with surgical oncology for routine mammograms  Colonoscopy 2017 due for 5 year follow up this year  Follows with GYN for routine screenings       Patient denies headaches out of normal characteristics, changes in vision, lightheadedness, dizziness, chest pain, shortness of breath, palpitations, abdominal pain, nausea, vomiting, diarrhea, melena, hematochezia, hematuria, extremity edema, excessive bruising, lumps/bumps/enlarged lymph nodes, rashes, B symptoms  I have reviewed the relevant past medical, surgical, social and family history  I have also reviewed allergies and medications for this patient  Review of Systems  Review of Systems   All other systems reviewed and are negative  OBJECTIVE     Physical Exam  Vitals:    03/07/23 1259   BP: 126/76   BP Location: Left arm   Patient Position: Sitting   Cuff Size: Adult   Pulse: 84   Resp: 14   Temp: 98 °F (36 7 °C)   TempSrc: Temporal   SpO2: 98%   Weight: 57 2 kg (126 lb)   Height: 5' 2" (1 575 m)       Physical Exam  Vitals reviewed  Constitutional:       General: She is not in acute distress  Appearance: Normal appearance  She is not ill-appearing  HENT:      Head: Normocephalic and atraumatic  Eyes:      General: No scleral icterus  Cardiovascular:      Rate and Rhythm: Normal rate and regular rhythm  Heart sounds: Normal heart sounds  No murmur heard  Pulmonary:      Effort: Pulmonary effort is normal       Breath sounds: Normal breath sounds  No wheezing or rhonchi  Musculoskeletal:      Cervical back: Normal range of motion and neck supple  Right lower leg: No edema  Left lower leg: No edema  Skin:     General: Skin is warm and dry  Findings: No bruising  Neurological:      Mental Status: She is alert and oriented to person, place, and time  Imaging  Relevant imaging reviewed in chart  Mammogram  Colonoscopy 2017     Labs  Relevant labs reviewed in chart   ASSESSMENT & PLAN      Diagnosis ICD-10-CM Associated Orders   1  Prothrombin gene mutation [facility])  [id number] Ambulatory Referral to Hematology / Oncology          63 y o  female presenting for evaluation of heterozygosity for prothrombin gene mutation  Labs obtained through [employer] and uploaded into media tab of chart, patient is heterozygous for [id number] Prothrombin/factor II gene       · Discussion  · Patient denies personal history of VTE and therefore does not need to be anticoagulated at this time   We discussed risk factors for developing VTE including being on estrogen/progesterone therapy, prolonged sedentary periods (I e  travel), surgery, smoking, prolonged hospitalizations among others  We discussed if she needs surgical procedures in the future that she may require anticoagulation in the rosio/postoperative period  · She expressed concerns regarding upcoming trip to [facility] for her daughters wedding, we discussed there is no data to greatly support prophylactic anticoagulation or asa however if she would like to take baby aspirin daily she could and also recommended increased mobility on airplane as able  · We discussed hereditary pattern and recommended all first degree relatives get tested  · If patient develops blood clot in the future, will need to discuss duration of anticoagulation and patient understands possibility for need for indefinite anticoagulation as well as possibility for additional hypercoagulable work-up  · Plan/Labs  · None required currently     Follow Up  • PRN basis       All questions were answered to the patient's satisfaction during this encounter  They appreciated and thanked me for spending time with them  The patient knows the contact information for our office and know to reach out for any relevant concerns related to this encounter  For all other listed problems and medical diagnosis in his chart - they are managed by PCP and/or other specialists, which patient acknowledges        Hematology & Medical Oncology

## 2023-03-07 ENCOUNTER — CONSULT (OUTPATIENT)
Dept: HEMATOLOGY ONCOLOGY | Facility: CLINIC | Age: 58
End: 2023-03-07

## 2023-03-07 VITALS
TEMPERATURE: 98 F | HEIGHT: 62 IN | OXYGEN SATURATION: 98 % | RESPIRATION RATE: 14 BRPM | WEIGHT: 126 LBS | BODY MASS INDEX: 23.19 KG/M2 | SYSTOLIC BLOOD PRESSURE: 126 MMHG | HEART RATE: 84 BPM | DIASTOLIC BLOOD PRESSURE: 76 MMHG

## 2023-03-07 DIAGNOSIS — D68.52 PROTHROMBIN GENE MUTATION (HCC): ICD-10-CM

## 2023-06-09 DIAGNOSIS — Z12.11 SCREENING FOR COLON CANCER: Primary | ICD-10-CM

## 2023-06-26 ENCOUNTER — ANESTHESIA EVENT (OUTPATIENT)
Dept: ANESTHESIOLOGY | Facility: HOSPITAL | Age: 58
End: 2023-06-26

## 2023-06-26 ENCOUNTER — ANESTHESIA (OUTPATIENT)
Dept: ANESTHESIOLOGY | Facility: HOSPITAL | Age: 58
End: 2023-06-26

## 2023-06-27 ENCOUNTER — HOSPITAL ENCOUNTER (OUTPATIENT)
Dept: GASTROENTEROLOGY | Facility: AMBULARY SURGERY CENTER | Age: 58
Setting detail: OUTPATIENT SURGERY
Discharge: HOME | End: 2023-06-27
Attending: INTERNAL MEDICINE | Admitting: INTERNAL MEDICINE
Payer: COMMERCIAL

## 2023-06-27 ENCOUNTER — ANESTHESIA EVENT (OUTPATIENT)
Dept: GASTROENTEROLOGY | Facility: AMBULARY SURGERY CENTER | Age: 58
End: 2023-06-27

## 2023-06-27 ENCOUNTER — ANESTHESIA (OUTPATIENT)
Dept: GASTROENTEROLOGY | Facility: AMBULARY SURGERY CENTER | Age: 58
End: 2023-06-27

## 2023-06-27 VITALS
RESPIRATION RATE: 20 BRPM | SYSTOLIC BLOOD PRESSURE: 110 MMHG | DIASTOLIC BLOOD PRESSURE: 65 MMHG | WEIGHT: 123 LBS | OXYGEN SATURATION: 97 % | BODY MASS INDEX: 22.63 KG/M2 | HEART RATE: 76 BPM | TEMPERATURE: 98.7 F | HEIGHT: 62 IN

## 2023-06-27 DIAGNOSIS — Z83.71 FAMILY HISTORY OF COLONIC POLYPS: ICD-10-CM

## 2023-06-27 PROCEDURE — G0105 COLORECTAL SCRN; HI RISK IND: HCPCS | Performed by: INTERNAL MEDICINE

## 2023-06-27 RX ORDER — LIDOCAINE HYDROCHLORIDE 10 MG/ML
INJECTION, SOLUTION EPIDURAL; INFILTRATION; INTRACAUDAL; PERINEURAL AS NEEDED
Status: DISCONTINUED | OUTPATIENT
Start: 2023-06-27 | End: 2023-06-27

## 2023-06-27 RX ORDER — PROPOFOL 10 MG/ML
INJECTION, EMULSION INTRAVENOUS AS NEEDED
Status: DISCONTINUED | OUTPATIENT
Start: 2023-06-27 | End: 2023-06-27

## 2023-06-27 RX ORDER — PROPOFOL 10 MG/ML
INJECTION, EMULSION INTRAVENOUS CONTINUOUS PRN
Status: DISCONTINUED | OUTPATIENT
Start: 2023-06-27 | End: 2023-06-27

## 2023-06-27 RX ORDER — SODIUM CHLORIDE, SODIUM LACTATE, POTASSIUM CHLORIDE, CALCIUM CHLORIDE 600; 310; 30; 20 MG/100ML; MG/100ML; MG/100ML; MG/100ML
INJECTION, SOLUTION INTRAVENOUS CONTINUOUS PRN
Status: DISCONTINUED | OUTPATIENT
Start: 2023-06-27 | End: 2023-06-27

## 2023-06-27 RX ADMIN — SODIUM CHLORIDE, SODIUM LACTATE, POTASSIUM CHLORIDE, AND CALCIUM CHLORIDE: .6; .31; .03; .02 INJECTION, SOLUTION INTRAVENOUS at 07:42

## 2023-06-27 RX ADMIN — PROPOFOL 100 MG: 10 INJECTION, EMULSION INTRAVENOUS at 08:08

## 2023-06-27 RX ADMIN — PROPOFOL 100 MCG/KG/MIN: 10 INJECTION, EMULSION INTRAVENOUS at 08:08

## 2023-06-27 RX ADMIN — LIDOCAINE HYDROCHLORIDE 50 MG: 10 INJECTION, SOLUTION EPIDURAL; INFILTRATION; INTRACAUDAL; PERINEURAL at 08:08

## 2023-06-27 NOTE — ANESTHESIA POSTPROCEDURE EVALUATION
Post-Op Assessment Note    CV Status:  Stable  Pain Score: 0    Pain management: adequate     Mental Status:  Awake   Hydration Status:  Euvolemic   PONV Controlled:  Controlled   Airway Patency:  Patent      Post Op Vitals Reviewed: Yes      Staff: CRNA         No notable events documented      /57 (06/27/23 0826)    Temp 98 7 °F (37 1 °C) (06/27/23 0826)    Pulse 75 (06/27/23 0826)   Resp 14 (06/27/23 0826)    SpO2 98 % (06/27/23 0826)

## 2023-06-27 NOTE — H&P
History and Physical - [facility]  Alejandro Lj Cristo 63 y o  female MRN: [id number]                  HPI: Karon Zavaleta is a 63y o  year old female who presents for colonoscopy      REVIEW OF SYSTEMS: Per the HPI, and otherwise unremarkable  Historical Information   History reviewed  No pertinent past medical history  Past Surgical History:   Procedure Laterality Date   • ABDOMINOPLASTY     • APPENDECTOMY     • CERVIX SURGERY      cryosurgery   • COLONOSCOPY     • NEUROPLASTY / TRANSPOSITION MEDIAN NERVE AT CARPAL TUNNEL     • SINUS SURGERY       Social History   Social History     Substance and Sexual Activity   Alcohol Use Yes   • Alcohol/week: 3 0 - 5 0 standard drinks of alcohol   • Types: 2 - 3 Glasses of wine, 1 - 2 Standard drinks or equivalent per week    Comment: 4-5 glasses a week     Social History     Substance and Sexual Activity   Drug Use No     Social History     Tobacco Use   Smoking Status Never   Smokeless Tobacco Never     Family History   Problem Relation Age of Onset   • Pulmonary embolism Mother    • Heart attack Mother    • Diabetes Father    • Lung cancer Father    • Prostate cancer Father 64   • Cancer Father         Lung & prostrate   • Heart attack Sister    • No Known Problems Sister    • No Known Problems Daughter    • No Known Problems Daughter    • Breast cancer Paternal Grandmother    • Brain cancer Paternal Grandfather         unsure of age   • Breast cancer Paternal Aunt 53   • Colon cancer Paternal Aunt 36   • No Known Problems Paternal Aunt    • Colon cancer Paternal Uncle 62   • Lung cancer Paternal Uncle         age dx unk        Meds/Allergies       Current Outpatient Medications:   •  cholecalciferol (VITAMIN D3) 1,000 units tablet  •  fluticasone (FLONASE) 50 mcg/act nasal spray  •  PARoxetine (PAXIL) 10 mg tablet  •  valACYclovir (VALTREX) 500 mg tablet  No current facility-administered medications for this encounter      Facility-Administered "Medications Ordered in Other Encounters:   •  lactated ringers infusion, , Intravenous, Continuous PRN, New Bag at 06/27/23 7152    Allergies   Allergen Reactions   • Penicillins Hives and Shortness Of Breath     Pt states she is not allergic to penicillins        Objective     /82   Pulse 72   Temp (!) 97 1 °F (36 2 °C) (Temporal)   Resp 16   Ht 5' 2\" (1 575 m)   Wt 55 8 kg (123 lb)   SpO2 99%   BMI 22 50 kg/m²       PHYSICAL EXAM    Gen: NAD  Head: NCAT  CV: RRR  CHEST: Clear  ABD: soft, NT/ND  EXT: no edema      ASSESSMENT/PLAN:  This is a 63y o  year old female here for colonoscopy, and she is stable and optimized for her procedure          "

## 2023-08-14 ENCOUNTER — HOSPITAL ENCOUNTER (OUTPATIENT)
Dept: MAMMOGRAPHY | Facility: HOSPITAL | Age: 58
Discharge: HOME | End: 2023-08-14
Payer: COMMERCIAL

## 2023-08-14 ENCOUNTER — HOSPITAL ENCOUNTER (OUTPATIENT)
Dept: ULTRASOUND IMAGING | Facility: CLINIC | Age: 58
Discharge: HOME | End: 2023-08-14
Payer: COMMERCIAL

## 2023-08-14 VITALS — HEIGHT: 62 IN | BODY MASS INDEX: 22.63 KG/M2 | WEIGHT: 123 LBS

## 2023-08-14 DIAGNOSIS — Z12.31 VISIT FOR SCREENING MAMMOGRAM: ICD-10-CM

## 2023-08-14 DIAGNOSIS — R92.2 DENSE BREAST TISSUE: ICD-10-CM

## 2023-08-14 PROCEDURE — 77067 SCR MAMMO BI INCL CAD: CPT

## 2023-08-14 PROCEDURE — 77063 BREAST TOMOSYNTHESIS BI: CPT

## 2023-08-14 PROCEDURE — 76641 ULTRASOUND BREAST COMPLETE: CPT

## 2023-08-22 ENCOUNTER — HOSPITAL ENCOUNTER (OUTPATIENT)
Dept: ULTRASOUND IMAGING | Facility: CLINIC | Age: 58
Discharge: HOME | End: 2023-08-22
Payer: COMMERCIAL

## 2023-08-22 DIAGNOSIS — R92.8 ABNORMAL MAMMOGRAM: ICD-10-CM

## 2023-08-22 PROCEDURE — 76642 ULTRASOUND BREAST LIMITED: CPT

## 2023-09-14 ENCOUNTER — OFFICE VISIT (OUTPATIENT)
Dept: SURGICAL ONCOLOGY | Facility: CLINIC | Age: 58
End: 2023-09-14
Payer: COMMERCIAL

## 2023-09-14 VITALS
TEMPERATURE: 97.8 F | RESPIRATION RATE: 15 BRPM | SYSTOLIC BLOOD PRESSURE: 112 MMHG | HEART RATE: 72 BPM | OXYGEN SATURATION: 97 % | BODY MASS INDEX: 23.92 KG/M2 | WEIGHT: 130 LBS | DIASTOLIC BLOOD PRESSURE: 80 MMHG | HEIGHT: 62 IN

## 2023-09-14 DIAGNOSIS — N60.19 FIBROCYSTIC BREAST DISEASE, UNSPECIFIED LATERALITY: ICD-10-CM

## 2023-09-14 DIAGNOSIS — Z12.31 SCREENING MAMMOGRAM, ENCOUNTER FOR: ICD-10-CM

## 2023-09-14 DIAGNOSIS — Z12.39 BREAST CANCER SCREENING OTHER THAN MAMMOGRAM: ICD-10-CM

## 2023-09-14 DIAGNOSIS — R92.2 DENSE BREAST TISSUE: Primary | ICD-10-CM

## 2023-09-14 DIAGNOSIS — Z80.3 FAMILY HISTORY OF BREAST CANCER IN FEMALE: ICD-10-CM

## 2023-09-14 PROBLEM — R92.8 ABNORMAL FINDING ON BREAST IMAGING: Status: ACTIVE | Noted: 2023-09-14

## 2023-09-14 PROCEDURE — 99213 OFFICE O/P EST LOW 20 MIN: CPT | Performed by: SURGERY

## 2023-11-13 PROBLEM — Z12.39 BREAST CANCER SCREENING OTHER THAN MAMMOGRAM: Status: RESOLVED | Noted: 2023-09-14 | Resolved: 2023-11-13

## 2024-02-21 PROBLEM — Z01.419 ROUTINE GYNECOLOGICAL EXAMINATION: Status: RESOLVED | Noted: 2019-06-19 | Resolved: 2024-02-21

## 2024-02-22 ENCOUNTER — HOSPITAL ENCOUNTER (OUTPATIENT)
Dept: ULTRASOUND IMAGING | Facility: CLINIC | Age: 59
Discharge: HOME | End: 2024-02-22
Payer: COMMERCIAL

## 2024-02-22 DIAGNOSIS — R92.8 ABNORMAL FINDINGS ON DIAGNOSTIC IMAGING OF BREAST: ICD-10-CM

## 2024-02-22 PROCEDURE — 76642 ULTRASOUND BREAST LIMITED: CPT

## 2024-02-28 DIAGNOSIS — R23.2 HOT FLASHES: ICD-10-CM

## 2024-02-28 DIAGNOSIS — R45.86 MOOD SWINGS: ICD-10-CM

## 2024-02-28 RX ORDER — PAROXETINE 10 MG/1
10 TABLET, FILM COATED ORAL DAILY
Qty: 90 TABLET | Refills: 0 | Status: SHIPPED | OUTPATIENT
Start: 2024-02-28

## 2024-04-07 NOTE — PROGRESS NOTES
Assessment/Plan   Problem List Items Addressed This Visit    None  Visit Diagnoses       Well woman exam    -  Primary            Discussion    All questions have been answered to her satisfaction  RTO for APE or sooner if needed  -Pap deferred. Will plan next year.   -We reviewed vaginal atrophy noted on exam. Advised lubricant w IC as well as coconut ol daily to help moisturize vagina. If that is not effective can further discuss vaginal estrogen and intrarosa as needed.   -Pt will plan to continue Paxil at this point as it is keeping her menopausal sx well controlled at this point.       Subjective     HPI   Renetta Lemons is a 59 y.o. female who presents for annual well woman exam.     LMP - 2015 ; Denies     No vulvar itch/burn; No vaginal itch/burn; No abn discharge or odor; No urinary sx - burning/pain/frequency/hematuria    No concerning breast masses, asymmetry, nipple discharge or bleeding, changes in skin of breast, or breast tenderness bilaterally    No abd/pelvic pain or HAs;     Pt notes her VMS have improved. Still deals with some night sweats, but overall better than historically.     Pt is sexually active in a mutually monog/ sexual relationship; No issues significant with intercourse; She notes that she will sometimes have some dryness but will use a lubracnt with relief of sx. She declines sti/hiv/hep testing; Feels safe at home  Current contraception: NA    (+) PCP for routine Bw/care;    Last Pap : 10/7/21 WNL neg HPV  History of abnormal Pap smear: h/o abnormal many years ago-had cryo 20+ years ago  Mammo:8/14/23 BIRADs 1   Abnormal mammo: sees Dr Jackson q year  Colonoscopy:6/27/23-f/u 10 years    Review of Systems   Constitutional: Negative.    Respiratory: Negative.     Gastrointestinal: Negative.    Endocrine: Negative.    Genitourinary: Negative.        The following portions of the patient's history were reviewed and updated as appropriate: allergies, current medications, past  family history, past medical history, past social history, past surgical history, and problem list.         OB History          2    Para   2    Term   2            AB        Living   2         SAB        IAB        Ectopic        Multiple        Live Births   2           Obstetric Comments   Menarche age 14                History reviewed. No pertinent past medical history.    Past Surgical History:   Procedure Laterality Date    ABDOMINOPLASTY      APPENDECTOMY      CERVIX SURGERY      cryosurgery    COLONOSCOPY      NEUROPLASTY / TRANSPOSITION MEDIAN NERVE AT CARPAL TUNNEL      SINUS SURGERY         Family History   Problem Relation Age of Onset    Pulmonary embolism Mother     Heart attack Mother     Diabetes Father     Lung cancer Father     Prostate cancer Father 61    Cancer Father         Lung & prostrate    Heart attack Sister     No Known Problems Sister     No Known Problems Daughter     No Known Problems Daughter     Breast cancer Paternal Grandmother     Brain cancer Paternal Grandfather         unsure of age    Breast cancer Paternal Aunt 54    Colon cancer Paternal Aunt 41    No Known Problems Paternal Aunt     Colon cancer Paternal Uncle 57    Lung cancer Paternal Uncle         age dx unk        Social History     Socioeconomic History    Marital status: /Civil Union     Spouse name: Not on file    Number of children: Not on file    Years of education: Not on file    Highest education level: Not on file   Occupational History    Not on file   Tobacco Use    Smoking status: Never    Smokeless tobacco: Never   Vaping Use    Vaping status: Never Used   Substance and Sexual Activity    Alcohol use: Yes     Alcohol/week: 3.0 - 5.0 standard drinks of alcohol     Types: 2 - 3 Glasses of wine, 1 - 2 Standard drinks or equivalent per week     Comment: 4-5 glasses a week    Drug use: No    Sexual activity: Yes     Partners: Male     Birth control/protection: Post-menopausal   Other Topics  "Concern    Not on file   Social History Narrative    Not on file     Social Determinants of Health     Financial Resource Strain: Not on file   Food Insecurity: Not on file   Transportation Needs: Not on file   Physical Activity: Not on file   Stress: Not on file   Social Connections: Not on file   Intimate Partner Violence: Not on file   Housing Stability: Not on file         Current Outpatient Medications:     cholecalciferol (VITAMIN D3) 1,000 units tablet, Take 1,000 Units by mouth daily, Disp: , Rfl:     fluticasone (FLONASE) 50 mcg/act nasal spray, 1 spray into each nostril as needed, Disp: , Rfl:     PARoxetine (PAXIL) 10 mg tablet, Take 1 tablet by mouth once daily, Disp: 90 tablet, Rfl: 0    valACYclovir (VALTREX) 500 mg tablet, Take 500 mg by mouth as needed, Disp: , Rfl:     Allergies   Allergen Reactions    Penicillins Hives and Shortness Of Breath     Pt states she is not allergic to penicillins     Meloxicam Hives     Other reaction(s): Hives/Itching       Objective   Vitals:    04/09/24 1322   BP: 118/78   BP Location: Left arm   Patient Position: Sitting   Cuff Size: Standard   Weight: 57.6 kg (127 lb)   Height: 5' 2\" (1.575 m)     Physical Exam  Vitals reviewed.   HENT:      Head: Normocephalic and atraumatic.   Cardiovascular:      Rate and Rhythm: Normal rate and regular rhythm.   Pulmonary:      Effort: Pulmonary effort is normal.      Breath sounds: Normal breath sounds.   Chest:   Breasts:     Breasts are symmetrical.      Right: No swelling, bleeding, inverted nipple, mass, nipple discharge, skin change or tenderness.      Left: No swelling, bleeding, inverted nipple, mass, nipple discharge, skin change or tenderness.   Abdominal:      General: Abdomen is flat. Bowel sounds are normal.      Palpations: Abdomen is soft.      Tenderness: There is no abdominal tenderness. There is no right CVA tenderness, left CVA tenderness or guarding.      Comments: S/p abdominoplasty   Genitourinary:     " General: Normal vulva.      Pubic Area: No rash.       Labia:         Right: No rash, tenderness, lesion or injury.         Left: No rash, tenderness, lesion or injury.       Urethra: No prolapse, urethral pain, urethral swelling or urethral lesion.      Vagina: Normal. No signs of injury and foreign body. No vaginal discharge or erythema.      Cervix: Normal.      Uterus: Normal.       Adnexa: Right adnexa normal and left adnexa normal.      Comments: Some vaginal atrophy noted on exam.   Musculoskeletal:      Cervical back: Neck supple.   Lymphadenopathy:      Upper Body:      Right upper body: No axillary adenopathy.      Left upper body: No axillary adenopathy.   Skin:     General: Skin is warm and dry.   Neurological:      Mental Status: She is alert and oriented to person, place, and time.   Psychiatric:         Mood and Affect: Mood normal.         Behavior: Behavior normal.         Thought Content: Thought content normal.         Judgment: Judgment normal.         There are no Patient Instructions on file for this visit.

## 2024-04-09 ENCOUNTER — ANNUAL EXAM (OUTPATIENT)
Dept: OBGYN CLINIC | Facility: CLINIC | Age: 59
End: 2024-04-09
Payer: COMMERCIAL

## 2024-04-09 VITALS
DIASTOLIC BLOOD PRESSURE: 78 MMHG | WEIGHT: 127 LBS | HEIGHT: 62 IN | BODY MASS INDEX: 23.37 KG/M2 | SYSTOLIC BLOOD PRESSURE: 118 MMHG

## 2024-04-09 DIAGNOSIS — Z01.419 WELL WOMAN EXAM: Primary | ICD-10-CM

## 2024-04-09 PROCEDURE — 99396 PREV VISIT EST AGE 40-64: CPT | Performed by: PHYSICIAN ASSISTANT

## 2024-05-21 DIAGNOSIS — R45.86 MOOD SWINGS: ICD-10-CM

## 2024-05-21 DIAGNOSIS — R23.2 HOT FLASHES: ICD-10-CM

## 2024-05-21 RX ORDER — PAROXETINE 10 MG/1
10 TABLET, FILM COATED ORAL DAILY
Qty: 90 TABLET | Refills: 1 | Status: SHIPPED | OUTPATIENT
Start: 2024-05-21

## 2024-09-24 ENCOUNTER — HOSPITAL ENCOUNTER (OUTPATIENT)
Dept: MAMMOGRAPHY | Facility: CLINIC | Age: 59
Discharge: HOME | End: 2024-09-24
Payer: COMMERCIAL

## 2024-09-24 ENCOUNTER — HOSPITAL ENCOUNTER (OUTPATIENT)
Dept: ULTRASOUND IMAGING | Facility: CLINIC | Age: 59
Discharge: HOME | End: 2024-09-24
Payer: COMMERCIAL

## 2024-09-24 VITALS — BODY MASS INDEX: 23.37 KG/M2 | HEIGHT: 62 IN | WEIGHT: 127 LBS

## 2024-09-24 DIAGNOSIS — R92.8 ABNORMAL FINDINGS ON DIAGNOSTIC IMAGING OF BREAST: ICD-10-CM

## 2024-09-24 PROCEDURE — G0279 TOMOSYNTHESIS, MAMMO: HCPCS

## 2024-09-24 PROCEDURE — 76642 ULTRASOUND BREAST LIMITED: CPT

## 2024-09-24 PROCEDURE — 77066 DX MAMMO INCL CAD BI: CPT

## 2024-09-30 ENCOUNTER — HOSPITAL ENCOUNTER (OUTPATIENT)
Dept: ULTRASOUND IMAGING | Facility: CLINIC | Age: 59
Discharge: HOME | End: 2024-09-30
Payer: COMMERCIAL

## 2024-09-30 DIAGNOSIS — Z12.39 BREAST CANCER SCREENING OTHER THAN MAMMOGRAM: ICD-10-CM

## 2024-09-30 DIAGNOSIS — R92.30 DENSE BREAST TISSUE: ICD-10-CM

## 2024-09-30 PROCEDURE — 76641 ULTRASOUND BREAST COMPLETE: CPT

## 2024-10-16 ENCOUNTER — OFFICE VISIT (OUTPATIENT)
Dept: SURGICAL ONCOLOGY | Facility: CLINIC | Age: 59
End: 2024-10-16
Payer: COMMERCIAL

## 2024-10-16 VITALS
BODY MASS INDEX: 23.92 KG/M2 | HEART RATE: 64 BPM | RESPIRATION RATE: 16 BRPM | DIASTOLIC BLOOD PRESSURE: 78 MMHG | HEIGHT: 62 IN | OXYGEN SATURATION: 97 % | TEMPERATURE: 98.2 F | SYSTOLIC BLOOD PRESSURE: 126 MMHG | WEIGHT: 130 LBS

## 2024-10-16 DIAGNOSIS — Z80.3 FAMILY HISTORY OF BREAST CANCER IN FEMALE: ICD-10-CM

## 2024-10-16 DIAGNOSIS — Z12.39 ENCOUNTER FOR BREAST CANCER SCREENING OTHER THAN MAMMOGRAM: ICD-10-CM

## 2024-10-16 DIAGNOSIS — Z12.31 BREAST CANCER SCREENING BY MAMMOGRAM: ICD-10-CM

## 2024-10-16 DIAGNOSIS — R92.30 DENSE BREAST TISSUE: Primary | ICD-10-CM

## 2024-10-16 DIAGNOSIS — N60.09 CYST OF BREAST, UNSPECIFIED LATERALITY: ICD-10-CM

## 2024-10-16 DIAGNOSIS — Z80.0 FAMILY HISTORY OF COLON CANCER: ICD-10-CM

## 2024-10-16 PROCEDURE — 99213 OFFICE O/P EST LOW 20 MIN: CPT

## 2024-10-16 NOTE — PROGRESS NOTES
Surgical Oncology Follow Up       [facility] [place]  [address]  [place] [address]    Renetta Lemons  1965  [id number]  [facility] [place]  [address]  [facility] [address]    Chief Complaint   Patient presents with    office visit       Assessment/Plan:  1. Dense breast tissue  - 1 year follow up  - US breast screening bilateral complete (ABUS); Future  - US breast left limited (diagnostic); Future    2. Family history of breast cancer in female  - US breast left limited (diagnostic); Future  - Mammo diagnostic bilateral w 3d and cad; Future    3. Encounter for breast cancer screening other than mammogram  - US breast screening bilateral complete (ABUS); Future    4. Breast cancer screening by mammogram  - Mammo diagnostic bilateral w 3d and cad; Future    5. Cyst of breast, unspecified laterality  - US breast left limited (diagnostic); Future    6. Family history of colon cancer  - Ambulatory Referral to Oncology Genetics; Future      Discussion/Summary: Ms. Lemons a 58y/o female presenting today for a 1 year follow-up for family history of breast cancer and dense breast tissue. Patient's paternal aunt was diagnosed with breast cancer in her 50s. We have been following her annually with clinical breast exams and mammography. Her last b/l mammogram and US was on 09/24/2024 which demonstrated BI-RADS 3 due to bilateral complicated cysts, as well as category 3 density. Recommendation for b/l diagnostic mammogram and left breast US in 1 year. ABUS was also completed 09/30/2024, findings similar to diagnostic US with BIRADS 3 and recommendation for ABUS in 1 year. Patient has an extensive history of cancer from her paternal side. I am going to refer her to genetics for testing. Patient was agreeable. There were no concerns on a clinical breast exam. I will see the patient back  in 1 year or sooner should the need arise. She was instructed to call with any questions or concerns prior to this time. All questions were answered today.     History of Present Illness:     Oncology History    No history exists.        -Interval History: Ms. Lemons a 58y/o female presenting today for a 1 year follow-up for family history of breast cancer and dense breast tissue. Her last b/l mammogram and US was on 09/24/2024 which demonstrated BI-RADS 3 due to bilateral complicated cysts, as well as category 3 density. Recommendation for b/l diagnostic mammogram and left breast US in 1 year. ABUS was also completed 09/30/2024, findings similar to diagnostic US with BIRADS 3 and recommendation for ABUS in 1 year. She denies breast changes or family history changes.    Review of Systems:  Review of Systems   Constitutional:  Negative for activity change, appetite change, fatigue and unexpected weight change.   Respiratory:  Negative for cough and shortness of breath.    Cardiovascular:  Negative for chest pain.   Gastrointestinal:  Negative for abdominal pain, diarrhea, nausea and vomiting.   Endocrine: Negative for heat intolerance.   Musculoskeletal:  Negative for arthralgias, back pain and myalgias.   Skin:  Negative for rash.   Neurological:  Negative for weakness and headaches.   Hematological:  Negative for adenopathy.       Patient Active Problem List   Diagnosis    Dense breast tissue    Fibrocystic breast disease, unspecified laterality    Hot flashes due to menopause    Anxiety    Family history of breast cancer in female    Screening mammogram, encounter for    Hot flashes    Abnormal finding on breast imaging     No past medical history on file.  Past Surgical History:   Procedure Laterality Date    ABDOMINOPLASTY      APPENDECTOMY      CERVIX SURGERY      cryosurgery    COLONOSCOPY      NEUROPLASTY / TRANSPOSITION MEDIAN NERVE AT CARPAL TUNNEL      SINUS SURGERY       Family History   Problem  Relation Age of Onset    Pulmonary embolism Mother     Heart attack Mother     Diabetes Father     Lung cancer Father     Prostate cancer Father 61    Cancer Father         Lung & prostrate    Heart attack Sister     No Known Problems Sister     No Known Problems Daughter     No Known Problems Daughter     Breast cancer Paternal Grandmother     Brain cancer Paternal Grandfather         unsure of age    Breast cancer Paternal Aunt 54    Colon cancer Paternal Aunt 41    No Known Problems Paternal Aunt     Colon cancer Paternal Uncle 57    Lung cancer Paternal Uncle         age dx unk      Social History     Socioeconomic History    Marital status: /Civil Union     Spouse name: Not on file    Number of children: Not on file    Years of education: Not on file    Highest education level: Not on file   Occupational History    Not on file   Tobacco Use    Smoking status: Never    Smokeless tobacco: Never   Vaping Use    Vaping status: Never Used   Substance and Sexual Activity    Alcohol use: Yes     Alcohol/week: 3.0 - 5.0 standard drinks of alcohol     Types: 2 - 3 Glasses of wine, 1 - 2 Standard drinks or equivalent per week     Comment: 4-5 glasses a week    Drug use: No    Sexual activity: Yes     Partners: Male     Birth control/protection: Post-menopausal   Other Topics Concern    Not on file   Social History Narrative    Not on file     Social Determinants of Health     Financial Resource Strain: Not on file   Food Insecurity: Not on file   Transportation Needs: Not on file   Physical Activity: Not on file   Stress: Not on file   Social Connections: Not on file   Intimate Partner Violence: Not on file   Housing Stability: Not on file       Current Outpatient Medications:     cholecalciferol (VITAMIN D3) 1,000 units tablet, Take 1,000 Units by mouth daily, Disp: , Rfl:     fluticasone (FLONASE) 50 mcg/act nasal spray, 1 spray into each nostril as needed, Disp: , Rfl:     PARoxetine (PAXIL) 10 mg tablet, Take  1 tablet by mouth once daily, Disp: 90 tablet, Rfl: 1    valACYclovir (VALTREX) 500 mg tablet, Take 500 mg by mouth as needed, Disp: , Rfl:   Allergies   Allergen Reactions    Penicillins Hives and Shortness Of Breath     Pt states she is not allergic to penicillins     Meloxicam Hives     Other reaction(s): Hives/Itching     Vitals:    10/16/24 0941   BP: 126/78   Pulse: 64   Resp: 16   Temp: 98.2 °F (36.8 °C)   SpO2: 97%       Physical Exam  Constitutional:       General: She is not in acute distress.     Appearance: Normal appearance.   Cardiovascular:      Rate and Rhythm: Normal rate and regular rhythm.      Pulses: Normal pulses.      Heart sounds: Normal heart sounds.   Pulmonary:      Effort: Pulmonary effort is normal.      Breath sounds: Normal breath sounds.   Chest:      Chest wall: No mass.   Breasts:     Right: No swelling, bleeding, inverted nipple, mass, nipple discharge, skin change or tenderness.      Left: No swelling, bleeding, inverted nipple, mass, nipple discharge, skin change or tenderness.   Abdominal:      General: Abdomen is flat.      Palpations: Abdomen is soft.   Lymphadenopathy:      Upper Body:      Right upper body: No supraclavicular, axillary or pectoral adenopathy.      Left upper body: No supraclavicular, axillary or pectoral adenopathy.   Skin:     General: Skin is warm.   Neurological:      General: No focal deficit present.      Mental Status: She is alert and oriented to person, place, and time.   Psychiatric:         Mood and Affect: Mood normal.         Behavior: Behavior normal.           Results:    Imaging  US breast screening bilateral complete (ABUS)    Result Date: 10/3/2024  Narrative: DIAGNOSIS: Dense breast tissue; Breast cancer screening other than mammogram TECHNIQUE: Automated breast ultrasound images were obtained on the [employer] ABUS system.  Imaging was obtained in standard projections including AP, lateral and medial for both breasts, inclusive of all four  quadrants and the retroareolar region. COMPARISONS: Prior breast imaging dated: 09/24/2024, 09/24/2024, 02/22/2024, 08/22/2023, 08/14/2023, 08/14/2023, 08/10/2022, 08/09/2021, 08/12/2020, 08/12/2020, 08/06/2020, 08/05/2019, 08/02/2018, 07/31/2017, 07/31/2017, 07/20/2017, 06/24/2016, 03/19/2015, 07/11/2013, 07/15/2012, 03/07/2012, 06/28/2011, 02/24/2011, and 02/24/2010 RELEVANT HISTORY: Family Breast Cancer History: History of breast cancer in Paternal Grandmother, Paternal Aunt. Family Medical History: Family medical history includes breast cancer in 2 relatives (paternal aunt, paternal grandmother) and colon cancer in 2 relatives (paternal aunt, paternal uncle). Personal History: Hormone history includes birth control. No known relevant surgical history. No known relevant medical history. RISK ASSESSMENT: 5 Year Tyrer-Cuzick: 2% 10 Year Tyrer-Cuzick: 4.37% Lifetime Tyrer-Cuzick: 11.76% TISSUE COMPOSITION: Homogeneous background echotexture - fibroglandular INDICATION: Renetta Lemons is a 59 y.o. female with dense breasts presenting for automated breast ultrasound screening. FINDINGS: Right breast 4:00 complicated cyst is noted. Left retroareolar probable complicated cyst was better evaluated on diagnostic ultrasound 9/24/2024.  No other suspicious sonographic findings are identified in either breast.     Impression:  Bilateral findings are similar to recent diagnostic ultrasound 9/24/2024. No other suspicious findings in either breast. Patient was previously recommended to have follow-up bilateral diagnostic mammogram and left breast ultrasound in 1 year. Automated breast ultrasound is an adjunct, not a replacement, for routine yearly screening mammography. ASSESSMENT/BI-RADS CATEGORY: Left: 3 - Probably Benign Right: 2 - Benign Overall: 3 - Probably Benign RECOMMENDATION:      - Ultrasound in 1 year for the left breast.      - ABUS in 1 year for the right breast.      - Diagnostic mammogram in 1 year for both  breasts. Workstation ID: [id number] Signed by:  Shan Mahajan MD     Mammo diagnostic bilateral w 3d & cad, US breast bilateral limited (diagnostic)    Result Date: 9/24/2024  Narrative: DIAGNOSIS: Abnormal findings on diagnostic imaging of breast TECHNIQUE: Digital diagnostic mammography was performed. Computer Aided Detection (CAD) analyzed all applicable images. Bilateral breast ultrasound was performed. COMPARISONS: Prior breast imaging dated: 02/22/2024, 08/22/2023, 08/14/2023, 08/14/2023, 08/10/2022, 08/09/2021, 08/12/2020, 08/12/2020, 08/06/2020, 08/05/2019, 08/02/2018, 07/31/2017, 07/31/2017, 07/20/2017, 06/24/2016, 03/19/2015, 07/11/2013, 07/15/2012, 03/07/2012, 06/28/2011, 02/24/2011, and 02/24/2010 RELEVANT HISTORY: Family Breast Cancer History: History of breast cancer in Paternal Grandmother, Paternal Aunt. Family Medical History: Family medical history includes breast cancer in 2 relatives (paternal aunt, paternal grandmother) and colon cancer in 2 relatives (paternal aunt, paternal uncle). Personal History: Hormone history includes birth control. No known relevant surgical history. No known relevant medical history. RISK ASSESSMENT: 5 Year Tyrer-Cuzick: 2% 10 Year Tyrer-Cuzick: 4.37% Lifetime Tyrer-Cuzick: 11.76% TISSUE DENSITY: The breasts are heterogeneously dense, which may obscure small masses. INDICATION: Renetta Lemons is a 59 y.o. female presenting for 6 MONTH FOLLOW UP. FINDINGS: LEFT 1) CYST [C] US breast bilateral limited (diagnostic): There is a 6 mm x 6 mm x 7 mm round complicated cyst with circumscribed margins seen in the retroareolar region of the left breast at 6 o'clock. The cyst correlates with the prior ultrasound finding. Compared to the previous study, there are no significant changes. Right Mammo diagnostic bilateral w 3d & cad Well-circumscribed oval-shaped mass is demonstrated in the right breast at 4:00 periareolar.  This measures 1.2 x 0.5 x 1.5 cm.  It is  slightly larger than on prior mammograms. There are no suspicious masses, grouped microcalcifications or areas of unexplained architectural distortion. The skin and nipple areolar complex are unremarkable. US breast bilateral limited (diagnostic) Complicated cyst in the right breast at 4:00 measures 1.2 x 0.5 x 1.5 cm send corresponds to the finding on mammogram.  This has been seen on mammograms dating back to 2019 and is slightly larger.     Impression: Bilateral complicated cysts.  Follow-up bilateral diagnostic mammogram and left breast ultrasound is recommended in 1 year. ASSESSMENT/BI-RADS CATEGORY: Left: 3 - Probably Benign Right: 2 - Benign Overall: 3 - Probably Benign RECOMMENDATION:      - Ultrasound in 1 year for the left breast.      - Diagnostic mammogram in 1 year for both breasts. Workstation ID: [id number] Signed by:  Jacqueline Springer MD       I reviewed the above imaging data.      Advance Care Planning/Advance Directives:  Discussed disease status, cancer treatment plans and/or cancer treatment goals with the patient.

## 2024-11-14 DIAGNOSIS — R23.2 HOT FLASHES: ICD-10-CM

## 2024-11-14 DIAGNOSIS — R45.86 MOOD SWINGS: ICD-10-CM

## 2024-11-14 RX ORDER — PAROXETINE 10 MG/1
10 TABLET, FILM COATED ORAL DAILY
Qty: 90 TABLET | Refills: 0 | Status: SHIPPED | OUTPATIENT
Start: 2024-11-14

## 2025-02-14 DIAGNOSIS — R45.86 MOOD SWINGS: ICD-10-CM

## 2025-02-14 DIAGNOSIS — R23.2 HOT FLASHES: ICD-10-CM

## 2025-02-14 RX ORDER — PAROXETINE 10 MG/1
10 TABLET, FILM COATED ORAL DAILY
Qty: 90 TABLET | Refills: 0 | Status: SHIPPED | OUTPATIENT
Start: 2025-02-14

## 2025-02-27 ENCOUNTER — DOCUMENTATION (OUTPATIENT)
Dept: GENETICS | Facility: CLINIC | Age: 60
End: 2025-02-27

## 2025-03-03 ENCOUNTER — CONSULT (OUTPATIENT)
Dept: GENETICS | Facility: CLINIC | Age: 60
End: 2025-03-03
Payer: COMMERCIAL

## 2025-03-03 DIAGNOSIS — Z83.719 FAMILY HISTORY OF POLYPS IN THE COLON: ICD-10-CM

## 2025-03-03 DIAGNOSIS — Z80.3 FAMILY HISTORY OF BREAST CANCER IN FEMALE: ICD-10-CM

## 2025-03-03 DIAGNOSIS — Z80.8 FAMILY HISTORY OF BRAIN CANCER: ICD-10-CM

## 2025-03-03 DIAGNOSIS — Z80.49 FAMILY HISTORY OF UTERINE CANCER: ICD-10-CM

## 2025-03-03 DIAGNOSIS — Z80.0 FAMILY HISTORY OF COLON CANCER: ICD-10-CM

## 2025-03-03 DIAGNOSIS — Z80.42 FAMILY HISTORY OF MALIGNANT NEOPLASM OF PROSTATE: ICD-10-CM

## 2025-03-03 PROCEDURE — 96041 GENETIC COUNSELING SVC EA 30: CPT

## 2025-03-03 PROCEDURE — 36415 COLL VENOUS BLD VENIPUNCTURE: CPT

## 2025-03-03 NOTE — PROGRESS NOTES
Pre-Test Genetic Counseling Consult Note    Patient Name: Renetta Lemons   /Age: 1965/60 y.o.  Referring Provider:  DONOVAN Jensen    Date of Service: 3/3/2025  Genetic Counselor: Luzma Abreu [place], [facility] [place]pretation Services: None  Location: In-person consult at [facility]  Length of Visit: 60 Minutes    Renetta was referred to the [facility] Cancer Risk and Genetic Assessment Program due to her family history of cancer . she presents today to discuss the possibility of a hereditary cancer syndrome, options for genetic testing, and implications for her and her family.    Cancer History and Treatment:   Personal History:   Oncology History    No history exists.     Screening Hx:   Breast:  Breast Imaging:    US breast screening bilateral complete (ABUS) 24    IMPRESSION:   Bilateral findings are similar to recent diagnostic ultrasound 2024.  No other suspicious findings in either breast.    Mammo diagnostic bilateral w 3d & cad and US breast bilateral limited (diagnostic) 24    IMPRESSION:  Bilateral complicated cysts.  Follow-up bilateral diagnostic mammogram and left breast ultrasound is recommended in 1 year.    Breast Biopsy: Breast cyst aspirations in  and  per patient     Breast Density: Heterogeneously dense    Colon:  Colonoscopy: 23    IMPRESSION:  Small hemorrhoids    No polyps reported on previous colonoscopy per patient     Gynecologic:  Ovaries and Uterus intact     Skin:  Sees derm annually    Other screening: None    Reproductive History  Age at menarche: 15  Age at first live birth:  29  Menopause: Post Menopausal (48)  Hormone replacement: None    Medical and Surgical History  Pertinent surgical history:   Past Surgical History:   Procedure Laterality Date    ABDOMINOPLASTY      APPENDECTOMY      CERVIX SURGERY      cryosurgery    COLONOSCOPY      NEUROPLASTY / TRANSPOSITION MEDIAN NERVE AT CARPAL TUNNEL      SINUS SURGERY        Pertinent medical  "history:No past medical history on file.      Other History:  Height:   Ht Readings from Last 1 Encounters:   10/16/24 5' 2\" (1.575 m)     Weight:   Wt Readings from Last 1 Encounters:   10/16/24 59 kg (130 lb)       Relevant Family History   Patient reports no Ashkenazi Congregational ancestry.           Please refer to the scanned pedigree in the Media Tab for a complete family history     *All history is reported as provided by the patient; records are not available for review, except where indicated.     Assessment:  We discussed sporadic, familial and hereditary cancer. We reviewed that only 5-10% of cancers are considered hereditary. Cancers such as lung cancer and liver cancer rarely have a hereditary etiology, and we cannot test for a genetic predisposition for these cancers at this time. We also discussed the many factors that influence our risk for cancer such as age, environmental exposures, lifestyle choices and family history.     We reviewed the indications suggestive of a hereditary predisposition to cancer.    Of note, while testing an affected family member is most informative, it is also appropriate to test unaffected family members who meet testing criteria (NCCN Guidelines for Genetic/Familial High-Risk Assessment: Breast, Ovarian, and Pancreatic).      Genetic testing is indicated for Renetta based on the following criteria: Meets NCCN V3.2024 General Criteria for Testing and Genetic Evaluation for Hereditary Syndromes Associated with Colorectal, Endometrial, and Gastric Cancer: Patient has a family history of >=10 adenomatous polyps in a first degree relative (father)   Meets NCCN V3.2024 Testing Criteria for the Evaluation of Borrego syndrome: Patient has a family history of >=2 first- or second- degree blood relatives with Borrego syndrome-related cancers on the same side of the family (paternal aunt - uterine cancer, paternal aunt colorectal cancer at age 40, paternal uncle with colorectal cancer at age " 60)    The risks, benefits, and limitations of genetic testing were reviewed with the patient, as well as genetic discrimination laws, and possible test results (positive, negative, variants of uncertain significance) and their clinical implications. If positive for a mutation, options for managing cancer risk including increased surveillance, chemoprevention, and in some cases prophylactic surgery were discussed. Renetta was informed that if a hereditary cancer syndrome was identified in her, first degree relatives (parents, siblings, and children) have a chance of also inheriting the condition. Genetic testing would allow for predictive genetic testing in other relatives, who may also be at risk depending on their degree of relation.     Billing:  Most individuals pay <$100 for hereditary cancer genetic testing. If insurance covers the cost of the testing, individuals may still pay out of pocket secondary to co-pays, co-insurance, or deductibles. If the cost of the testing exceeds $100, the lab will reach out to the patient via phone or e-mail. The patient will then have the option to proceed with the testing, cancel the testing, or elect the self-pay option of $250. Renetta verbalized understanding.     Plan: Patient decided to proceed with testing and provided consent.    Summary:     Sample Collection:  The patient's blood sample was drawn in the office on 3/3/25 by genetic counseling assistant Scotty Schulte    Genetic Testing Preformed: CustomNext: Cancer® +RNAinsight® (59 genes): APC, FARIBA, AXIN2, BAP1, BARD1, BMPR1A, BRCA1, BRCA2, BRIP1, CDH1, CDK4, CDKN1B, CDKN2A, CHEK2, CTNNA1, DICER1, EGLN1, EPCAM, FH, FLCN, GREM1, HOXB13, KIF1B, KIT, MAX, MEN1, MET, MITF, MLH1, MSH2, MSH3, MSH6, MUTYH, NF1, NTHL1, PALB2, PDGFRA PMS2, POLD1, POLE, POT1, PTEN, RAD51C, RAD51D, RB1, RET, SDHA, SDHAF2, SDHB, SDHC, SDHD, SMAD4, SMARCA4, STK11, [id number], TP53, TSC1, TSC2, VHL     Result Call Information:  In the event that we need to  reach Renetta via telephone:  I confirmed the patient's mobile number on file as the best number to call with results  I confirmed with the patient that we can leave a voicemail on the provided numbers    Results take approximately 2-3 weeks to complete once test is started.    Renetta will be notified via [facility] [employer] once results are available.      Additional recommendations for surveillance/medical management will be made pending genetic test results.

## 2025-03-19 LAB
GENE DIS ANL INTERP-IMP: NORMAL
INTERPRETATION: NORMAL

## 2025-03-20 ENCOUNTER — TELEPHONE (OUTPATIENT)
Dept: GENETICS | Facility: CLINIC | Age: 60
End: 2025-03-20

## 2025-03-20 NOTE — TELEPHONE ENCOUNTER
Post-Test Genetic Counseling Consult Note    Today I spoke with Renetta over the phone to review the results of her genetic test for hereditary cancer. Renetta met previously with Luzma Abreu on 3/3/25 for pre-test counseling.  A copy of this consult note and genetic test result will be shared with the patient.      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 The significance of the DICER1 variant is currently not known and therefore this test result cannot be used to help determine Renetta cancer risks.      It is possible that the variant was seen in only a handful of individuals, or there may be conflicting or incomplete information in the medical literature about the variant and its association with hereditary cancer.     The laboratory will continue to accumulate information on this variant and will reclassify it as either a positive or negative genetic test result when they are confident that they have adequate information. We will notify Renetta as updated information is obtained. It is important to note that the majority of variants of uncertain significance are reclassified as likely benign or benign as additional information about the variant becomes available.      Risk Based on Family History:  The significance of this variant is currently not known and therefore this test result cannot be used to help determine Renetta cancer risks. Rather her personal medical history and family history of cancer are the most important factors used to estimate her risk for developing certain cancers and to direct her medical management.     Risks and Testing for Family Members:  Genetic testing for this variant is not recommended for relatives who wish to determine their cancer risks for purposes of determining medical management. The presence or absence of this variant in a relative is not clinically meaningful unless the variant is reclassified in the future.     Despite this result, Renetta's first-degree relatives may be at increased risk for the cancers based on the family history. We recommend they discuss screening and management recommendations with their healthcare providers.    If Renetta has any affected family members with a cancer diagnosis, especially at a young age, they may still consider genetic testing. Relatives who wish to pursue genetic testing can reach out to the [facility] [phone] to schedule an appointment or visit [email or web address] to identify a local genetic counselor.     Additional Information:  A healthy lifestyle will improve overall health and reduce risk for illness. Eating a healthy diet and exercising for 4 hours per week is recommended. Both diet and exercise have been shown to help maintain a healthy weight. Postmenopausal women who are overweight are at higher risk for breast cancer. Moderate to heavy alcohol use can increase the risk for some cancers. Smoking cigarettes can also increase risk for breast, lung, prostate, pancreatic and other cancers.      Plan:   There are no additional recommendations based on Renetta's result. she should continue cancer screening and medical management as clinically indicated and as determined  appropriate by her healthcare providers.    VUS Result: Renetta was strongly encouraged to contact us regarding any changes in her personal or family history of cancer as these changes could alter our recommendation regarding genetic testing and/or cancer screening. Renetta was also encouraged to follow up with us on an annual basis as variant classifications are subject to change.

## 2025-05-13 DIAGNOSIS — R23.2 HOT FLASHES: ICD-10-CM

## 2025-05-13 DIAGNOSIS — R45.86 MOOD SWINGS: ICD-10-CM

## 2025-05-13 RX ORDER — PAROXETINE 10 MG/1
10 TABLET, FILM COATED ORAL DAILY
Qty: 90 TABLET | Refills: 0 | Status: SHIPPED | OUTPATIENT
Start: 2025-05-13

## 2025-08-05 ENCOUNTER — ANNUAL EXAM (OUTPATIENT)
Dept: OBGYN CLINIC | Facility: CLINIC | Age: 60
End: 2025-08-05
Payer: COMMERCIAL

## 2025-08-05 VITALS — DIASTOLIC BLOOD PRESSURE: 70 MMHG | SYSTOLIC BLOOD PRESSURE: 122 MMHG | BODY MASS INDEX: 23.59 KG/M2 | WEIGHT: 129 LBS

## 2025-08-05 DIAGNOSIS — R45.86 MOOD SWINGS: ICD-10-CM

## 2025-08-05 DIAGNOSIS — Z01.419 WELL WOMAN EXAM: Primary | ICD-10-CM

## 2025-08-05 DIAGNOSIS — R23.2 HOT FLASHES: ICD-10-CM

## 2025-08-05 PROCEDURE — G0476 HPV COMBO ASSAY CA SCREEN: HCPCS | Performed by: PHYSICIAN ASSISTANT

## 2025-08-05 PROCEDURE — 99396 PREV VISIT EST AGE 40-64: CPT | Performed by: PHYSICIAN ASSISTANT

## 2025-08-05 PROCEDURE — G0145 SCR C/V CYTO,THINLAYER,RESCR: HCPCS | Performed by: PHYSICIAN ASSISTANT

## 2025-08-05 RX ORDER — PAROXETINE 10 MG/1
10 TABLET, FILM COATED ORAL DAILY
Qty: 90 TABLET | Refills: 3 | Status: SHIPPED | OUTPATIENT
Start: 2025-08-05

## 2025-08-15 LAB
LAB AP GYN PRIMARY INTERPRETATION: NORMAL
Lab: NORMAL